# Patient Record
Sex: MALE | Race: WHITE | NOT HISPANIC OR LATINO | Employment: OTHER | URBAN - METROPOLITAN AREA
[De-identification: names, ages, dates, MRNs, and addresses within clinical notes are randomized per-mention and may not be internally consistent; named-entity substitution may affect disease eponyms.]

---

## 2017-01-10 ENCOUNTER — GENERIC CONVERSION - ENCOUNTER (OUTPATIENT)
Dept: OTHER | Facility: OTHER | Age: 72
End: 2017-01-10

## 2017-02-21 ENCOUNTER — GENERIC CONVERSION - ENCOUNTER (OUTPATIENT)
Dept: OTHER | Facility: OTHER | Age: 72
End: 2017-02-21

## 2017-11-01 ENCOUNTER — RX ONLY (RX ONLY)
Age: 72
End: 2017-11-01

## 2017-11-01 ENCOUNTER — DOCTOR'S OFFICE (OUTPATIENT)
Dept: URBAN - METROPOLITAN AREA CLINIC 136 | Facility: CLINIC | Age: 72
Setting detail: OPHTHALMOLOGY
End: 2017-11-01
Payer: COMMERCIAL

## 2017-11-01 DIAGNOSIS — Z79.84: ICD-10-CM

## 2017-11-01 DIAGNOSIS — E11.3293: ICD-10-CM

## 2017-11-01 DIAGNOSIS — E11.9: ICD-10-CM

## 2017-11-01 DIAGNOSIS — H18.423: ICD-10-CM

## 2017-11-01 DIAGNOSIS — H40.003: ICD-10-CM

## 2017-11-01 DIAGNOSIS — H04.123: ICD-10-CM

## 2017-11-01 PROCEDURE — 92134 CPTRZ OPH DX IMG PST SGM RTA: CPT | Performed by: OPHTHALMOLOGY

## 2017-11-01 PROCEDURE — 92004 COMPRE OPH EXAM NEW PT 1/>: CPT | Performed by: OPHTHALMOLOGY

## 2017-11-01 ASSESSMENT — SUPERFICIAL PUNCTATE KERATITIS (SPK)
OD_SPK: 2+ 3+
OS_SPK: 2+ 3+

## 2017-11-01 ASSESSMENT — CONFRONTATIONAL VISUAL FIELD TEST (CVF)
OS_FINDINGS: FULL
OD_FINDINGS: FULL

## 2017-11-01 ASSESSMENT — CORNEAL DYSTROPHY - BAND KERATOPATHY
OD_BANDKERATOPATHY: 2+
OS_BANDKERATOPATHY: 2+

## 2017-11-07 ENCOUNTER — ALLSCRIPTS OFFICE VISIT (OUTPATIENT)
Dept: OTHER | Facility: OTHER | Age: 72
End: 2017-11-07

## 2017-11-07 ENCOUNTER — LAB REQUISITION (OUTPATIENT)
Dept: LAB | Facility: HOSPITAL | Age: 72
End: 2017-11-07
Payer: COMMERCIAL

## 2017-11-07 DIAGNOSIS — Z12.5 ENCOUNTER FOR SCREENING FOR MALIGNANT NEOPLASM OF PROSTATE: ICD-10-CM

## 2017-11-07 DIAGNOSIS — E11.65 TYPE 2 DIABETES MELLITUS WITH HYPERGLYCEMIA (HCC): ICD-10-CM

## 2017-11-07 LAB
ALBUMIN SERPL BCP-MCNC: 3.6 G/DL (ref 3.5–5)
ALP SERPL-CCNC: 76 U/L (ref 46–116)
ALT SERPL W P-5'-P-CCNC: 38 U/L (ref 12–78)
ANION GAP SERPL CALCULATED.3IONS-SCNC: 10 MMOL/L (ref 4–13)
AST SERPL W P-5'-P-CCNC: 18 U/L (ref 5–45)
BASOPHILS # BLD AUTO: 0.03 THOUSANDS/ΜL (ref 0–0.1)
BASOPHILS NFR BLD AUTO: 1 % (ref 0–1)
BILIRUB SERPL-MCNC: 0.38 MG/DL (ref 0.2–1)
BUN SERPL-MCNC: 13 MG/DL (ref 5–25)
CALCIUM SERPL-MCNC: 10.1 MG/DL (ref 8.3–10.1)
CHLORIDE SERPL-SCNC: 102 MMOL/L (ref 100–108)
CHOLEST SERPL-MCNC: 219 MG/DL (ref 50–200)
CO2 SERPL-SCNC: 25 MMOL/L (ref 21–32)
CREAT SERPL-MCNC: 0.91 MG/DL (ref 0.6–1.3)
EOSINOPHIL # BLD AUTO: 0.08 THOUSAND/ΜL (ref 0–0.61)
EOSINOPHIL NFR BLD AUTO: 2 % (ref 0–6)
ERYTHROCYTE [DISTWIDTH] IN BLOOD BY AUTOMATED COUNT: 12.3 % (ref 11.6–15.1)
EST. AVERAGE GLUCOSE BLD GHB EST-MCNC: 324 MG/DL
GFR SERPL CREATININE-BSD FRML MDRD: 84 ML/MIN/1.73SQ M
GLUCOSE P FAST SERPL-MCNC: 298 MG/DL (ref 65–99)
HBA1C MFR BLD: 12.9 % (ref 4.2–6.3)
HCT VFR BLD AUTO: 42.4 % (ref 36.5–49.3)
HDLC SERPL-MCNC: 54 MG/DL (ref 40–60)
HGB BLD-MCNC: 14.7 G/DL (ref 12–17)
LDLC SERPL CALC-MCNC: 131 MG/DL (ref 0–100)
LYMPHOCYTES # BLD AUTO: 1.32 THOUSANDS/ΜL (ref 0.6–4.47)
LYMPHOCYTES NFR BLD AUTO: 29 % (ref 14–44)
MCH RBC QN AUTO: 30.6 PG (ref 26.8–34.3)
MCHC RBC AUTO-ENTMCNC: 34.7 G/DL (ref 31.4–37.4)
MCV RBC AUTO: 88 FL (ref 82–98)
MONOCYTES # BLD AUTO: 0.32 THOUSAND/ΜL (ref 0.17–1.22)
MONOCYTES NFR BLD AUTO: 7 % (ref 4–12)
NEUTROPHILS # BLD AUTO: 2.74 THOUSANDS/ΜL (ref 1.85–7.62)
NEUTS SEG NFR BLD AUTO: 61 % (ref 43–75)
NRBC BLD AUTO-RTO: 0 /100 WBCS
PLATELET # BLD AUTO: 215 THOUSANDS/UL (ref 149–390)
PMV BLD AUTO: 11.6 FL (ref 8.9–12.7)
POTASSIUM SERPL-SCNC: 4.6 MMOL/L (ref 3.5–5.3)
PROT SERPL-MCNC: 7 G/DL (ref 6.4–8.2)
PSA SERPL-MCNC: 0.4 NG/ML (ref 0–4)
RBC # BLD AUTO: 4.81 MILLION/UL (ref 3.88–5.62)
SODIUM SERPL-SCNC: 137 MMOL/L (ref 136–145)
TRIGL SERPL-MCNC: 172 MG/DL
WBC # BLD AUTO: 4.5 THOUSAND/UL (ref 4.31–10.16)

## 2017-11-07 PROCEDURE — 80061 LIPID PANEL: CPT | Performed by: FAMILY MEDICINE

## 2017-11-07 PROCEDURE — 80053 COMPREHEN METABOLIC PANEL: CPT | Performed by: FAMILY MEDICINE

## 2017-11-07 PROCEDURE — 85025 COMPLETE CBC W/AUTO DIFF WBC: CPT | Performed by: FAMILY MEDICINE

## 2017-11-07 PROCEDURE — 83036 HEMOGLOBIN GLYCOSYLATED A1C: CPT | Performed by: FAMILY MEDICINE

## 2017-11-07 PROCEDURE — G0103 PSA SCREENING: HCPCS | Performed by: FAMILY MEDICINE

## 2017-11-08 ENCOUNTER — GENERIC CONVERSION - ENCOUNTER (OUTPATIENT)
Dept: FAMILY MEDICINE CLINIC | Facility: CLINIC | Age: 72
End: 2017-11-08

## 2017-11-08 ENCOUNTER — GENERIC CONVERSION - ENCOUNTER (OUTPATIENT)
Dept: OTHER | Facility: OTHER | Age: 72
End: 2017-11-08

## 2017-11-08 LAB
LEFT EYE DIABETIC RETINOPATHY: NORMAL
RIGHT EYE DIABETIC RETINOPATHY: NORMAL

## 2017-11-08 ASSESSMENT — REFRACTION_AUTOREFRACTION
OS_AXIS: 060
OD_SPHERE: +0.25
OS_CYLINDER: -2.25
OS_SPHERE: +1.25
OD_AXIS: 150
OD_CYLINDER: -2.75

## 2017-11-08 ASSESSMENT — REFRACTION_MANIFEST
OD_VA1: 20/
OD_VA3: 20/
OD_VA3: 20/
OD_VA2: 20/
OD_VA2: 20/
OD_VA1: 20/
OU_VA: 20/
OS_VA2: 20/
OD_VA1: 20/
OS_VA1: 20/
OU_VA: 20/
OS_VA1: 20/
OD_VA2: 20/
OS_VA3: 20/
OS_VA2: 20/
OS_VA1: 20/
OS_VA2: 20/
OS_VA3: 20/
OS_VA3: 20/
OU_VA: 20/
OD_VA3: 20/

## 2017-11-08 ASSESSMENT — REFRACTION_CURRENTRX
OD_OVR_VA: 20/
OD_OVR_VA: 20/
OS_OVR_VA: 20/
OS_OVR_VA: 20/
OD_OVR_VA: 20/
OS_OVR_VA: 20/

## 2017-11-08 ASSESSMENT — VISUAL ACUITY
OD_BCVA: 20/25-2
OS_BCVA: 20/30+1

## 2017-11-08 ASSESSMENT — SPHEQUIV_DERIVED
OD_SPHEQUIV: -1.125
OS_SPHEQUIV: 0.125

## 2017-11-08 NOTE — PROGRESS NOTES
Assessment  1  Diabetes mellitus type 2, uncontrolled (250 02) (E11 65)   2  BPH (benign prostatic hyperplasia) (600 00) (N40 0)   3  Dyslipidemia (272 4) (E78 5)   4  Hypertension (401 9) (I10)    Plan  Diabetes mellitus type 2, uncontrolled    · (1) CBC/PLT/DIFF; Status:Hold For - Exact Date; Requested for: In Office Collection;    · (1) COMPREHENSIVE METABOLIC PANEL; Status:Hold For - Exact Date; Requested  for: In Office Collection;    · (1) HEMOGLOBIN A1C; Status:Hold For - Exact Date; Requested for: In Office Collection;    · (1) LIPID PANEL, FASTING; Status:Hold For - Exact Date; Requested for: In Office  Collection;    · (1) MICROALBUMIN CREATININE RATIO, RANDOM URINE; Status:Hold For - Exact Date; Requested for: In Office Collection;   Diabetes mellitus type 2, uncontrolled, Prostate cancer screening    · (1) PSA (SCREEN) (Dx V76 44 Screen for Prostate Cancer); Status:Hold For - Exact Date; Requested for: In Office Collection;   DMII (diabetes mellitus, type 2)    · *VB - Foot Exam; Status:Resulted - Requires Verification,Retrospective By Protocol  Authorization;   Done: 42UGL5706 11:39AM    Discussion/Summary    Recommend recheck in office in 4 months  Patient has had flu vaccine  We will refill medications for patient  Urged better compliance  Discussed options for getting generic medications least expensive at particular pharmacies locally  The patient was counseled regarding instructions for management,-- risks and benefits of treatment options  total time of encounter was 25 minutes-- and-- 15 minutes was spent counseling  Chief Complaint  recheck for lungs  Patient is here today for follow up of chronic conditions described in HPI  History of Present Illness  Patient here for recheck on chronic medical conditions  He has not been seen in approximately a year  He is not following with an endocrinologist recently  He admits to noncompliance with medication   He states the cost is the biggest barrier to compliance  He states he was without insurance for a while and this affected ability to obtain insurance  The patient states his hyperlipidemia has been stable since the last visit  Comorbid Illnesses: diabetes mellitus  He has no significant interval events  Symptoms: denies chest pain,-- denies intermittent leg claudication,-- denies muscle pain-- and-- denies muscle weakness  Associated symptoms include no focal neurologic deficits-- and-- no memory loss  Medications: the patient is not adherent with his medication regimen-- (Patient is unsure if he is taking medications)  He reports inability to manage his medications, but-- belief that the medication is helping  The patient states he has been doing well with his blood pressure control since the last visit  He has no comorbid illnesses  He has no significant interval events  Symptoms: The patient is currently asymptomatic  The patient states he has been doing poorly with his Type II Diabetes control since the last visit  He has no comorbid illnesses  He has no known diabetic complications  He has no significant interval events  Symptoms: The patient is currently asymptomatic  Review of Systems    Constitutional: No fever or chills, feels well, no tiredness, no recent weight gain or weight loss  Eyes: No complaints of eye pain, no red eyes, no discharge from eyes, no itchy eyes  ENT: no complaints of earache, no hearing loss, no nosebleeds, no nasal discharge, no sore throat, no hoarseness  Cardiovascular: No complaints of slow heart rate, no fast heart rate, no chest pain, no palpitations, no leg claudication, no lower extremity  Respiratory: No complaints of shortness of breath, no wheezing, no cough, no SOB on exertion, no orthopnea or PND  Gastrointestinal: No complaints of abdominal pain, no constipation, no nausea or vomiting, no diarrhea or bloody stools     Genitourinary: No complaints of dysuria, no incontinence, no hesitancy, no nocturia, no genital lesion, no testicular pain  Musculoskeletal: No complaints of arthralgia, no myalgias, no joint swelling or stiffness, no limb pain or swelling  Integumentary: No complaints of skin rash or skin lesions, no itching, no skin wound, no dry skin  Neurological: No compliants of headache, no confusion, no convulsions, no numbness or tingling, no dizziness or fainting, no limb weakness, no difficulty walking  Psychiatric: Is not suicidal, no sleep disturbances, no anxiety or depression, no change in personality, no emotional problems  Endocrine: No complaints of proptosis, no hot flashes, no muscle weakness, no erectile dysfunction, no deepening of the voice, no feelings of weakness  Hematologic/Lymphatic: No complaints of swollen glands, no swollen glands in the neck, does not bleed easily, no easy bruising  Active Problems  1  Acute serous otitis media, unspecified laterality   2  BPH (benign prostatic hyperplasia) (600 00) (N40 0)   3  Cutaneous skin tags (701 9) (L91 8)   4  Diabetes mellitus type 2, uncontrolled (250 02) (E11 65)   5  DMII (diabetes mellitus, type 2) (250 00) (E11 9)   6  Dyslipidemia (272 4) (E78 5)   7  Obesity (278 00) (E66 9)   8  Prostate cancer screening (V76 44) (Z12 5)   9  Psoriasis (696 1) (L40 9)   10  Screening for colon cancer (V76 51) (Z12 11)    Past Medical History  1  History of No pertinent past surgical history    The active problems and past medical history were reviewed and updated today  Surgical History  1  History of Tonsillectomy   2  History of Treatment Of The Right Leg    Family History  Mother    1  Family history of Heart problem  Sister    2  Family history of Bladder Cancer (V16 52)  Brother    3  Family history of diabetes mellitus (V18 0) (Z83 3)   4   Family history of Leukemia (V16 6)    Social History   · Daily Coffee Consumption (2  Cups/Day)   · Former smoker (S51 32) (E11 884)   · Stopped Drinking Alcohol  The social history was reviewed and updated today  The social history was reviewed and is unchanged  Current Meds   1  Glimepiride 2 MG Oral Tablet; TAKE ONE TABLET BY MOUTH TWICE A DAY WITH MEALS; Therapy: 03Tqc4837 to (Evaluate:92Ejp5807)  Requested for: 49IZU5793; Last   Rx:16May2016 Ordered   2  Lisinopril 20 MG Oral Tablet; TAKE 1 TABLET DAILY; Therapy: 65UCD5341 to (Evaluate:44Bfp2030)  Requested for: 51JOJ7968; Last   Rx:13Mar2017 Ordered   3  MetFORMIN HCl - 1000 MG Oral Tablet; TAKE 1 TABLETS  twice a day with meals; Therapy: 90Vkk2137 to (Evaluate:11May2017)  Requested for: 39IKX4939; Last   Rx:16May2016 Ordered   4  OneTouch Delica Lancets 38L Miscellaneous; Check twice a day; Therapy: 96OBL4940 to (Evaluate:18Feb2017)  Requested for: 61Xsk3537; Last   Rx:30Pry1438 Ordered   5  OneTouch Ultra Blue In [employer]; TEST 3 times  DAILY; Therapy: 30KWU2767 to (Evaluate:51Nyr2499)  Requested for: 57SSI1251; Last   Rx:22Std1269 Ordered   6  Pioglitazone HCl - 15 MG Oral Tablet; TAKE 1 TABLET DAILY FOR DIABETES; Therapy: 19XQS6401 to (Evaluate:59Dwu4461)  Requested for: 17QTZ7688; Last   Rx:15May2017 Ordered   7  Simvastatin 20 MG Oral Tablet; TAKE 1 TABLET AT BEDTIME; Therapy: 37FQW0959 to (Stephnaie Nj)  Requested for: 61HWZ2345; Last   Rx:27May2016 Ordered   8  Tamsulosin HCl - 0 4 MG Oral Capsule; TAKE 1 CAPSULE Bedtime For Prostate; Therapy: 64Skm0266 to (Evaluate:85Aeo9389)  Requested for: 04SYR4816; Last   Rx:95Aoe9136 Ordered    The medication list was reviewed and updated today  Allergies  1  No Known Drug Allergies    Vitals  Vital Signs    Recorded: 65HFJ5307 11:12AM   Temperature 69 6 F   Systolic 685   Diastolic 90   Height 5 ft 3 in   Weight 218 lb    BMI Calculated 38 62   BSA Calculated 2 01   O2 Saturation 97     Physical Exam    Constitutional   General appearance: No acute distress, well appearing and well nourished      Eyes   Conjunctiva and lids: No swelling, erythema, or discharge  Pupils and irises: Equal, round and reactive to light  Ears, Nose, Mouth, and Throat   External inspection of ears and nose: Normal     Otoscopic examination: Tympanic membrance translucent with normal light reflex  Canals patent without erythema  Nasal mucosa, septum, and turbinates: Normal without edema or erythema  Oropharynx: Normal with no erythema, edema, exudate or lesions  Pulmonary   Respiratory effort: No increased work of breathing or signs of respiratory distress  Auscultation of lungs: Clear to auscultation, equal breath sounds bilaterally, no wheezes, no rales, no rhonci  Cardiovascular   Palpation of heart: Normal PMI, no thrills  Auscultation of heart: Normal rate and rhythm, normal S1 and S2, without murmurs  Examination of extremities for edema and/or varicosities: Normal     Carotid pulses: Normal     Abdomen   Abdomen: Non-tender, no masses  Liver and spleen: No hepatomegaly or splenomegaly  Lymphatic   Palpation of lymph nodes in neck: No lymphadenopathy  Musculoskeletal   Gait and station: Normal     Digits and nails: Normal without clubbing or cyanosis  Inspection/palpation of joints, bones, and muscles: Normal     Skin   Skin and subcutaneous tissue: Normal without rashes or lesions  Neurologic   Cranial nerves: Cranial nerves 2-12 intact  Reflexes: 2+ and symmetric  Sensation: No sensory loss  Psychiatric   Orientation to person, place and time: Normal     Mood and affect: Normal         Socks and shoes removed, Right Foot Findings: normal foot, no swelling, no erythema  The right toes were normal-- and-- had full range of motion  The sensory exam showed normal vibratory sensation at the level of the toes on the right  Normal tactile sensation with monofilament testing throughout the right foot  Socks and shoes removed, Left Foot Findings: normal foot, no swelling, no erythema   The left toes were normal-- and-- had full range of motion  The sensory exam showed normal vibratory sensation at the level of the toes on the left  Normal tactile sensation with monofilament testing throughout the left foot  Pulses:   2+ in the dorsalis pedis on the right  Pulses:   2+ in the dorsalis pedis on the left  Health Management  Screening for colon cancer   COLONOSCOPY; every 5 years; Next Due: 06Fnc9573;  Overdue    Signatures   Electronically signed by : Cassy Maldonado DO; Nov 7 2017 11:48AM EST                       (Author)

## 2017-11-10 ENCOUNTER — GENERIC CONVERSION - ENCOUNTER (OUTPATIENT)
Dept: OTHER | Facility: OTHER | Age: 72
End: 2017-11-10

## 2017-11-14 ENCOUNTER — GENERIC CONVERSION - ENCOUNTER (OUTPATIENT)
Dept: OTHER | Facility: OTHER | Age: 72
End: 2017-11-14

## 2017-11-22 ENCOUNTER — DOCTOR'S OFFICE (OUTPATIENT)
Dept: URBAN - METROPOLITAN AREA CLINIC 136 | Facility: CLINIC | Age: 72
Setting detail: OPHTHALMOLOGY
End: 2017-11-22
Payer: COMMERCIAL

## 2017-11-22 DIAGNOSIS — H40.003: ICD-10-CM

## 2017-11-22 DIAGNOSIS — H04.123: ICD-10-CM

## 2017-11-22 DIAGNOSIS — H18.423: ICD-10-CM

## 2017-11-22 DIAGNOSIS — Z79.84: ICD-10-CM

## 2017-11-22 PROBLEM — E11.9 DIABETES TYPE 2 NO RETINOPATHY: Status: ACTIVE | Noted: 2017-11-01

## 2017-11-22 PROCEDURE — 92012 INTRM OPH EXAM EST PATIENT: CPT | Performed by: OPHTHALMOLOGY

## 2017-11-22 ASSESSMENT — REFRACTION_AUTOREFRACTION
OD_AXIS: 150
OS_CYLINDER: -2.25
OS_AXIS: 060
OD_CYLINDER: -2.75
OD_SPHERE: +0.25
OS_SPHERE: +1.25

## 2017-11-22 ASSESSMENT — REFRACTION_MANIFEST
OS_VA2: 20/
OD_VA3: 20/
OD_VA2: 20/
OD_VA1: 20/
OD_VA1: 20/
OS_VA3: 20/
OS_VA1: 20/
OS_VA2: 20/
OU_VA: 20/
OU_VA: 20/
OD_VA2: 20/
OD_VA2: 20/
OD_VA3: 20/
OS_VA3: 20/
OS_VA1: 20/
OU_VA: 20/
OS_VA1: 20/
OS_VA3: 20/
OD_VA3: 20/
OD_VA1: 20/
OS_VA2: 20/

## 2017-11-22 ASSESSMENT — SUPERFICIAL PUNCTATE KERATITIS (SPK)
OD_SPK: 2+ 3+
OS_SPK: 2+ 3+

## 2017-11-22 ASSESSMENT — VISUAL ACUITY
OD_BCVA: 20/30+2
OS_BCVA: 20/40

## 2017-11-22 ASSESSMENT — CORNEAL DYSTROPHY - BAND KERATOPATHY
OD_BANDKERATOPATHY: 1+
OS_BANDKERATOPATHY: 1+

## 2017-11-22 ASSESSMENT — REFRACTION_CURRENTRX
OS_OVR_VA: 20/
OD_OVR_VA: 20/
OD_OVR_VA: 20/
OS_OVR_VA: 20/
OS_OVR_VA: 20/
OD_OVR_VA: 20/

## 2017-11-22 ASSESSMENT — CONFRONTATIONAL VISUAL FIELD TEST (CVF)
OD_FINDINGS: FULL
OS_FINDINGS: FULL

## 2017-11-22 ASSESSMENT — SPHEQUIV_DERIVED
OD_SPHEQUIV: -1.125
OS_SPHEQUIV: 0.125

## 2018-01-10 NOTE — PROGRESS NOTES
Plan    1  DSMT/MNT Time Record; Status:Complete;   Done: 50VTE9902 12:00AM    Discussion/Summary    PATIENT EDUCATION RECORD   Indication for Services: type 2 Diabetes Mellitus  He is ready to learn  He has no barriers to learning  Healthy Eating:   Discussed general nutrition topics: Method: Instruction  Response: Verbalizes Understanding   Discussed importance of meal timing/consistency: Method: Instruction  Response: Verbalizes Understanding   Discussed nutrient types ( Cho/Fat/Protein): Method: Instruction and Handout  Response: Verbalizes Understanding   Discussed portion sizes: Method: Instruction and Handout  Response: Verbalizes Understanding  Provided food diary and instructions on use: Method: Instruction and HandoutResponse: Verbalizes Understanding   Provided meal planning: Method: Instruction  Response: Verbalizes Understanding His current weight is 217 5  His keal needs are ~1700 calories a day   His CHO's per meal are 45-60 grams  Discussed fat intake and choices: Method: Instruction and Handout  Response: Verbalizes Understanding   Discussed basic carbohydrate counting: Method: Instruction and Handout  Response: Verbalizes Understanding   Discussed Stop Light Strategy  Method: Instruction and Handout  Response: Verbalizes Understanding   Being Active:   Stated the benefits of exercise: Method: Instruction  Response: Verbalizes Understanding   Discussed "exercise guidelines": Method: Instruction  Response: Verbalizes Understanding ~He/She needs a follow up class in 4/6/2016   He was given the following educational materials: portion book and Stop Light Strategy   Chief Complaint  Patient with T2DM seen for diet consult per MD request       History of Present Illness  Weight at visit was 217 5 pounds  Patient states, "I weighed ~205 pounds in the fall when I was walking 2 hours daily  I stopped exercising because of the cold weather and my weight went up 15 pounds   I do plan on starting up with the walking again"  Patient reports getting government assistance for purchasing food (food stamps)  He uses a food bank 1x a month and gets one monthly delivery of food  Though, he cannot remember where the delivery of food comes from  Problems identified in food recall include inconsistent carbohydrate intake at meals, meal skipping, excess carbs/calories coming from sweetened iced tea and juice (consumes 3-4 (16oz) bottles of sweetened iced tea daily), excess calories from HS snacking, large portions, low intake of plant based foods, whole grains, and low fat dairy and general lack of balance  Encouraged patient to consume 3 meals a day 4-5 hours apart  Advised him to eliminate ALL sweetened beverages and limit his HS snack to either 1/2 cup of ice cream or 10 potato chips or 3 pretzels to assist with glycemic control  Patient would benefit from including some carbohydrate at all meals  Instructed patient on the Stop Light Strategy for making healthy food choices  Marileejonathan Gross was encouraged to keep his "red category" food intake to one serving a day  Patient is planning on resuming exercise soon and working his way back up to 2 hour walks daily  Lola Gross agree to keep daily food logs  He is scheduled for follow-up on 4/6/2016  RD will remain available for further dietary questions/concerns  This is his initial assessment    Present at session: patient    Medical Diagnosis/Reason for Referral:T2DM, Obesity, HTN, Dislipidemia  He has no special learning needs  His caloric needs are 1700 calories  Recent weight change: reported 15# weight gain since the fall  Patient  shops for food  Patient  cooks the food  Exercise routine:  Currently, patient is not exercising beyond his daily activities  Spring, summer and fall patient reports walking 2 hours daily     He eats breakfast at  8-9:00AM AM SKIPS MOST DAYS OF THE WEEK; coffee with milk and Equal daily; 2x a week 2 packets of flavored oatmeal or plain oatmeal OR 1 cup Bran Flakes with 1 cup 2% milk   He snacks at 10:00 AM 2x a week: 4 margarito cracker squares with 1 tsp PB; sweetened icced tea   He eats lunch at  12:00PM PM Leftovers: 2 stuffed peppers made with 90% ground beef, onion, celery and tomato sauce (no rice) OR cabbage filled with ground meat (no rice) OR 2 slices white [place] bread, 1 tsp mayonnaise, spiced or tavern ham, 1 slice white American cheese; iced tea or coffee    He eats dinner at  5:00 PM 3-4oz chicken thigh or breast, 1 cup mashed potato, 1/2-3/4 cup corn or peas or carrots, iced tea or coffee   He snacks at 8:00PM at bedtime 3x a week: 2 cups ice cream OR un-portioned potato chips or pretzels     NUTRITION DIAGNOSES   Food And Nutrition Related Knowledge Defici   Food and nutrition related knowledge deficit related to  lack of prior exposure to accurate nutrition related information  As evidenced by  no prior knowledge of need for food and nutrition related recommendations  Medical Nutrition Therapy Intervention: Carbohydrate counting, Meal planning, Strategies to increase the intake of plant based foods, Strategies to monitor portion control, Meal timing, Exercise Guidelines, Weight/BMI Goals and Stop Light Strategy   His comprehension was good   His motivation was good   His compliance was good   Goals:  1  Include breakfast daily (no sweetened cereals)  2  Avoid all sweetened beverages and juice  3  Initiate regular aerobic exercise  Results/Data  Encounter Results   DSMT/MNT Time Record [id number] 12:00AM Thaddeus Hernandez     Test Name Result Flag Reference   Date of Service 2/24/2016     Start - Stop Time 9:45-10:45AM     Total MInutes 60 minutes     Group Or Individual Instruction MNT-I       Future Appointments    Date/Time Provider Specialty Site   05/16/2016 10:00 AM JACINTA Craig   Endocrinology [facility] ENDOCRINOLOG[facility]   04/06/2016 09:45 AM Thaddeus Hernandez RD, LDN Diabetes Educator [facility] ENDOCRINOLOG[facility] Signatures   Electronically signed by : Warden Smitha High; Feb 25 2016 11:01AM EST                       (Author)    Electronically signed by : Warden Smitha High; Feb 25 2016 11:20AM EST                       (Author)    Electronically signed by : JACINTA Patel ; Feb 26 2016 10:38AM EST

## 2018-01-11 NOTE — RESULT NOTES
Message   A1C high 10 3, send over log in 2 weeks      Verified Results  (1) COMPREHENSIVE METABOLIC PANEL 37GDL7496 22:22KJ Juan Cea Kidney Disease Education Program recommendations are as follows:  GFR calculation is accurate only with a steady state creatinine  Chronic Kidney disease less than 60 ml/min/1 73 sq  meters  Kidney failure less than 15 ml/min/1 73 sq  meters  Test Name Result Flag Reference   GLUCOSE,RANDM 234 mg/dL H    SODIUM 139 mmol/L  136-145   POTASSIUM 4 4 mmol/L  3 5-5 3   CHLORIDE 105 mmol/L  100-108   CARBON DIOXIDE 27 mmol/L  21-32   ANION GAP (CALC) 7 mmol/L  4-13   BLOOD UREA NITROGEN 16 mg/dL  5-25   CREATININE 0 88 mg/dL  0 60-1 30   Standardized to IDMS reference method   CALCIUM 9 3 mg/dL  8 3-10 1   BILI, TOTAL 0 29 mg/dL  0 20-1 00   ALK PHOSPHATAS 78 U/L     ALT (SGPT) 41 U/L  12-78   AST(SGOT) 20 U/L  5-45   ALBUMIN 3 4 g/dL L 3 5-5 0   TOTAL PROTEIN 6 8 g/dL  6 4-8 2   eGFR Non-African American      >60 0 ml/min/1 73sq m     (1) MICROALBUMIN CREATININE RATIO, RANDOM URINE [id number] 04:50PM Marlyce Marine     Test Name Result Flag Reference   MICROALBUMIN/ CREAT R 8 mg/g creatinine  0-30   MICROALBUMIN,URINE 20 7 mg/L H 0 0-20 0   CREATININE URINE 252 0 mg/dL       (1) HEMOGLOBIN A1C 74EPQ3744 04:50PM Jenna Potter   5 7-6 4% impaired fasting glucose  >=6 5% diagnosis of diabetes    Falsely low levels are seen in conditions linked to short RBC life span-  hemolytic anemia, and splenomegaly  Falsely elevated levels are seen in situations where there is an increased production of RBC- receipt of erythropoietin or blood transfusions  Adopted from ADA-Clinical Practice Recommendations     Test Name Result Flag Reference   HEMOGLOBIN A1C 10 3 % H 4 0-5 6   EST  AVG   GLUCOSE 249 mg/dl

## 2018-01-12 NOTE — RESULT NOTES
Message   a1c improved from 10 3 to 8 , goal less than 7 , continue current regimen and watch diet     Lipids higher than last year is he not taking simvastatin ? Verified Results  (1) COMPREHENSIVE METABOLIC PANEL 25PQQ5184 97:37AP Nathen Mathews     Test Name Result Flag Reference   GLUCOSE,RANDM 225 mg/dL H    If the patient is fasting, the ADA then defines impaired fasting glucose as > 100 mg/dL and diabetes as > or equal to 123 mg/dL  SODIUM 139 mmol/L  136-145   POTASSIUM 4 3 mmol/L  3 5-5 3   CHLORIDE 105 mmol/L  100-108   CARBON DIOXIDE 27 mmol/L  21-32   ANION GAP (CALC) 7 mmol/L  4-13   BLOOD UREA NITROGEN 17 mg/dL  5-25   CREATININE 0 90 mg/dL  0 60-1 30   Standardized to IDMS reference method   CALCIUM 9 7 mg/dL  8 3-10 1   BILI, TOTAL 0 25 mg/dL  0 20-1 00   ALK PHOSPHATAS 67 U/L     ALT (SGPT) 39 U/L  12-78   AST(SGOT) 22 U/L  5-45   ALBUMIN 3 8 g/dL  3 5-5 0   TOTAL PROTEIN 6 8 g/dL  6 4-8 2   eGFR Non-African American      >60 0 ml/min/1 73sq m   [facility] Disease Education Program recommendations are as follows:  GFR calculation is accurate only with a steady state creatinine  Chronic Kidney disease less than 60 ml/min/1 73 sq  meters  Kidney failure less than 15 ml/min/1 73 sq  meters  (1) LIPID PANEL, FASTING [id number] 04:51PM Nathen Mathews     Test Name Result Flag Reference   CHOLESTEROL 229 mg/dL H    HDL,DIRECT 53 mg/dL  40-60   Specimen collection should occur prior to Metamizole administration due to the potential for falsely depressed results     LDL CHOLESTEROL CALCULATED 141 mg/dL H 0-100   Triglyceride:         Normal              <150 mg/dl       Borderline High    150-199 mg/dl       High               200-499 mg/dl       Very High          >499 mg/dl  Cholesterol:         Desirable        <200 mg/dl      Borderline High  200-239 mg/dl      High             >239 mg/dl  HDL Cholesterol:        High    >59 mg/dL      Low     <41 mg/dL  LDL CALCULATED:    This screening LDL is a calculated result  It does not have the accuracy of the Direct Measured LDL in the monitoring of patients with hyperlipidemia and/or statin therapy  Direct Measure LDL (LHC293) must be ordered separately in these patients  TRIGLYCERIDES 175 mg/dL H <=150   Specimen collection should occur prior to N-Acetylcysteine or Metamizole administration due to the potential for falsely depressed results  (1) HEMOGLOBIN A1C [id number] 04:51PM Digna Villalpando     Test Name Result Flag Reference   HEMOGLOBIN A1C 8 0 % H 4 2-6 3   EST  AVG   GLUCOSE 183 mg/dl

## 2018-01-13 VITALS
DIASTOLIC BLOOD PRESSURE: 90 MMHG | WEIGHT: 218 LBS | HEIGHT: 63 IN | SYSTOLIC BLOOD PRESSURE: 130 MMHG | TEMPERATURE: 96.8 F | OXYGEN SATURATION: 97 % | BODY MASS INDEX: 38.62 KG/M2

## 2018-01-13 NOTE — PROGRESS NOTES
Plan    1  DSMT/MNT Time Record; Status:Complete;   Done: 50UMM5533 12:00AM    Discussion/Summary    PATIENT EDUCATION RECORD   Indication for Services: type 2 Diabetes Mellitus  He is ready to learn  He has no barriers to learning  Healthy Eating:   Discussed general nutrition topics: Method: Instruction  Response: Verbalizes Understanding   Discussed importance of meal timing/consistency: Method: Instruction  Response: Verbalizes UnderstandingResponse: His current weight is 215  Discussed basic carbohydrate counting: Method: Instruction  Response: Verbalizes Understanding   Discussed Food record review  Method:  Chief Complaint  Patient seen for nutrition follow-up visit  History of Present Illness  Weight at today's visit was 215 pounds  Weight down 2 5 pounds since his initial visit on 2/24/2016  Positive changes patient reports making include trying to consume breakfast daily and eliminating the intake of juice/sweetened beverages  Patient states, "I gave away all the juice I got from the food bank  If I don't have it at home I won't drink it"  Food records were incomplete  However, Carlyn Santana appears to be trying to make some healthy food choices  Encouraged him to continue with his goals of three meals a day and no sweetened beverages/juice  Patient reports feeling frustrated with his blood glucose numbers as some of them are high  Per discussion, patient has only been taking one Glimepiride pill a day rather than the two prescribed and he has been taking both of the Metformin pills at the dinner meal  Reviewed patient's prescriptions with him and advised him to take one of each pill at both breakfast and dinner  Patient will continue keeping blood glucose logs and submit them to the MD for review  Carlyn Santana has not initiated aerobic exercise  He was encouraged to do so and work his way up to 150 minutes a week  Patient did not wish to schedule follow-up at this time   RD will remain available for further dietary questions/concerns  This is his follow-up assessment   Present at session: patient    Exercise routine:  Patient has not initiated aerobic exercise  Medical Nutrition Therapy Intervention: Meal timing, Behavior modification strategies and Food record review   His comprehension was good   His motivation was good   His compliance was good   Goals:  1  Consume 3 meals a day  2  Avoid juice/sweetened beverages  3  Initiate aerobic exercise  Vitals  Signs [Data Includes: Current Encounter]   Recorded: 17VDS9316 05:03PM   Weight: 215 lb   BMI Calculated: 37 49  BSA Calculated: 2 01    Results/Data  Encounter Results   DSMT/MNT Time Record [id number] 12:00AM Jose Kwong     Test Name Result Flag Reference   Date of Service 4/6/2016     Start - Stop Time 9:45-10:15AM     Total MInutes 30 minutes     Group Or Individual Instruction MNT-I       Future Appointments    Date/Time Provider Specialty Site   05/16/2016 10:00 AM JACINTA Montiel   Endocrinology [facility] ENDOCRINOLOGY     Signatures   Electronically signed by : Sandra Bianchi [facility]; Apr 6 2016  5:39PM EST                       (Author)    Electronically signed by : JACINTA Méndez ; Apr 7 2016  1:06PM EST

## 2018-01-17 NOTE — RESULT NOTES
Discussion/Summary   Diabetes is under very poor control  Recommend restarting all medications prescribed  Recommend recheck in office again in 3 months  Verified Results  (1) CBC/PLT/DIFF [id number] 11:50AM Franck Gibson     Test Name Result Flag Reference   WBC COUNT 4 50 Thousand/uL  4 31-10 16   RBC COUNT 4 81 Million/uL  3 88-5 62   HEMOGLOBIN 14 7 g/dL  12 0-17 0   HEMATOCRIT 42 4 %  36 5-49 3   MCV 88 fL  82-98   MCH 30 6 pg  26 8-34 3   MCHC 34 7 g/dL  31 4-37 4   RDW 12 3 %  11 6-15 1   MPV 11 6 fL  8 9-12 7   PLATELET COUNT 128 Thousands/uL  149-390   nRBC AUTOMATED 0 /100 WBCs     NEUTROPHILS RELATIVE PERCENT 61 %  43-75   LYMPHOCYTES RELATIVE PERCENT 29 %  14-44   MONOCYTES RELATIVE PERCENT 7 %  4-12   EOSINOPHILS RELATIVE PERCENT 2 %  0-6   BASOPHILS RELATIVE PERCENT 1 %  0-1   NEUTROPHILS ABSOLUTE COUNT 2 74 Thousands/? ??L  1 85-7 62   LYMPHOCYTES ABSOLUTE COUNT 1 32 Thousands/? ??L  0 60-4 47   MONOCYTES ABSOLUTE COUNT 0 32 Thousand/? ??L  0 17-1 22   EOSINOPHILS ABSOLUTE COUNT 0 08 Thousand/? ??L  0 00-0 61   BASOPHILS ABSOLUTE COUNT 0 03 Thousands/? ??L  0 00-0 10     (1) COMPREHENSIVE METABOLIC PANEL [id number] 69:84CM Franck Gibson     Test Name Result Flag Reference   SODIUM 137 mmol/L  136-145   POTASSIUM 4 6 mmol/L  3 5-5 3   CHLORIDE 102 mmol/L  100-108   CARBON DIOXIDE 25 mmol/L  21-32   ANION GAP (CALC) 10 mmol/L  4-13   BLOOD UREA NITROGEN 13 mg/dL  5-25   CREATININE 0 91 mg/dL  0 60-1 30   Standardized to IDMS reference method   CALCIUM 10 1 mg/dL  8 3-10 1   BILI, TOTAL 0 38 mg/dL  0 20-1 00   ALK PHOSPHATAS 76 U/L     ALT (SGPT) 38 U/L  12-78   Specimen collection should occur prior to Sulfasalazine and/or Sulfapyridine administration due to the potential for falsely depressed results  AST(SGOT) 18 U/L  5-45   Specimen collection should occur prior to Sulfasalazine administration due to the potential for falsely depressed results     ALBUMIN 3 6 g/dL  3 5-5 0   TOTAL PROTEIN 7 0 g/dL  6 4-8 2   eGFR 84 ml/min/1 73sq m     National Kidney Disease Education Program recommendations are as follows:  GFR calculation is accurate only with a steady state creatinine  Chronic Kidney disease less than 60 ml/min/1 73 sq  meters  Kidney failure less than 15 ml/min/1 73 sq  meters  GLUCOSE FASTING 298 mg/dL H 65-99   Specimen collection should occur prior to Sulfasalazine administration due to the potential for falsely depressed results  Specimen collection should occur prior to Sulfapyridine administration due to the potential for falsely elevated results  (1) HEMOGLOBIN A1C [id number] 11:50AM Zawatt     Test Name Result Flag Reference   HEMOGLOBIN A1C 12 9 % H 4 2-6 3   EST  AVG  GLUCOSE 324 mg/dl       (1) LIPID PANEL, FASTING [id number] 11:50AM Zawatt     Test Name Result Flag Reference   CHOLESTEROL 219 mg/dL H    HDL,DIRECT 54 mg/dL  40-60   Specimen collection should occur prior to Metamizole administration due to the potential for falsley depressed results  LDL CHOLESTEROL CALCULATED 131 mg/dL H 0-100   Triglyceride:        Normal <150 mg/dl   Borderline High 150-199 mg/dl   High 200-499 mg/dl   Very High >499 mg/dl      Cholesterol:       Desirable <200 mg/dl    Borderline High 200-239 mg/dl    High >239 mg/dl      HDL Cholesterol:       High>59 mg/dL    Low <41 mg/dL      This screening LDL is a calculated result  It does not have the accuracy of the Direct Measured LDL in the monitoring of patients with hyperlipidemia and/or statin therapy  Direct Measure LDL (NLU586) must be ordered separately in these patients  TRIGLYCERIDES 172 mg/dL H <=150   Specimen collection should occur prior to N-Acetylcysteine or Metamizole administration due to the potential for falsely depressed results       (1) PSA (SCREEN) (Dx V76 44 Screen for Prostate Cancer) [id number] 11:50AM Zawatt     Test Name Result Flag Reference   PROSTATE SPECIFIC ANTIGEN 0 4 ng/mL 0 0-4 0   American Urological Association Guidelines define biochemical recurrence of prostate cancer as a detectable or rising PSA value post-radical prostatectomy that is greater than or equal to 0 2 ng/mL with a second confirmatory level of greater than or equal to 0 2 ng/mL

## 2018-03-08 DIAGNOSIS — E11.9 TYPE 2 DIABETES MELLITUS WITHOUT COMPLICATION, UNSPECIFIED LONG TERM INSULIN USE STATUS: Primary | ICD-10-CM

## 2018-03-08 RX ORDER — GLIMEPIRIDE 2 MG/1
1 TABLET ORAL
COMMUNITY
Start: 2011-08-26 | End: 2018-03-08 | Stop reason: SDUPTHER

## 2018-03-09 RX ORDER — GLIMEPIRIDE 2 MG/1
2 TABLET ORAL
Qty: 90 TABLET | Refills: 3 | Status: SHIPPED | OUTPATIENT
Start: 2018-03-09 | End: 2019-05-06

## 2018-04-04 ENCOUNTER — TRANSCRIBE ORDERS (OUTPATIENT)
Dept: LAB | Facility: HOSPITAL | Age: 73
End: 2018-04-04

## 2018-04-04 ENCOUNTER — APPOINTMENT (OUTPATIENT)
Dept: LAB | Facility: HOSPITAL | Age: 73
End: 2018-04-04
Payer: COMMERCIAL

## 2018-04-04 DIAGNOSIS — R69 DIAGNOSIS UNKNOWN: Primary | ICD-10-CM

## 2018-04-04 DIAGNOSIS — R69 DIAGNOSIS UNKNOWN: ICD-10-CM

## 2018-04-04 LAB
CALCIUM SERPL-MCNC: 9.8 MG/DL (ref 8.3–10.1)
PHOSPHATE SERPL-MCNC: 2.7 MG/DL (ref 2.3–4.1)
URATE SERPL-MCNC: 4.4 MG/DL (ref 4.2–8)

## 2018-04-04 PROCEDURE — 84100 ASSAY OF PHOSPHORUS: CPT

## 2018-04-04 PROCEDURE — 84550 ASSAY OF BLOOD/URIC ACID: CPT

## 2018-04-04 PROCEDURE — 82310 ASSAY OF CALCIUM: CPT

## 2018-04-04 PROCEDURE — 36415 COLL VENOUS BLD VENIPUNCTURE: CPT

## 2018-09-06 ENCOUNTER — OFFICE VISIT (OUTPATIENT)
Dept: FAMILY MEDICINE CLINIC | Facility: CLINIC | Age: 73
End: 2018-09-06
Payer: COMMERCIAL

## 2018-09-06 VITALS
HEART RATE: 68 BPM | TEMPERATURE: 97.6 F | DIASTOLIC BLOOD PRESSURE: 90 MMHG | WEIGHT: 194 LBS | BODY MASS INDEX: 33.12 KG/M2 | HEIGHT: 64 IN | OXYGEN SATURATION: 97 % | SYSTOLIC BLOOD PRESSURE: 138 MMHG

## 2018-09-06 DIAGNOSIS — E78.5 DYSLIPIDEMIA: ICD-10-CM

## 2018-09-06 DIAGNOSIS — E11.9 TYPE 2 DIABETES MELLITUS WITHOUT COMPLICATION, WITHOUT LONG-TERM CURRENT USE OF INSULIN (HCC): ICD-10-CM

## 2018-09-06 DIAGNOSIS — E11.9 DM TYPE 2, GOAL HBA1C < 7% (HCC): Primary | ICD-10-CM

## 2018-09-06 DIAGNOSIS — I10 ESSENTIAL HYPERTENSION: ICD-10-CM

## 2018-09-06 LAB
ALBUMIN SERPL BCP-MCNC: 3.5 G/DL (ref 3.5–5)
ALP SERPL-CCNC: 88 U/L (ref 46–116)
ALT SERPL W P-5'-P-CCNC: 42 U/L (ref 12–78)
ANION GAP SERPL CALCULATED.3IONS-SCNC: 7 MMOL/L (ref 4–13)
AST SERPL W P-5'-P-CCNC: 21 U/L (ref 5–45)
BASOPHILS # BLD AUTO: 0.03 THOUSANDS/ΜL (ref 0–0.1)
BASOPHILS NFR BLD AUTO: 1 % (ref 0–1)
BILIRUB SERPL-MCNC: 0.62 MG/DL (ref 0.2–1)
BUN SERPL-MCNC: 14 MG/DL (ref 5–25)
CALCIUM SERPL-MCNC: 9.7 MG/DL (ref 8.3–10.1)
CHLORIDE SERPL-SCNC: 100 MMOL/L (ref 100–108)
CHOLEST SERPL-MCNC: 255 MG/DL (ref 50–200)
CO2 SERPL-SCNC: 27 MMOL/L (ref 21–32)
CREAT SERPL-MCNC: 0.94 MG/DL (ref 0.6–1.3)
CREAT UR-MCNC: 69 MG/DL
EOSINOPHIL # BLD AUTO: 0.08 THOUSAND/ΜL (ref 0–0.61)
EOSINOPHIL NFR BLD AUTO: 2 % (ref 0–6)
ERYTHROCYTE [DISTWIDTH] IN BLOOD BY AUTOMATED COUNT: 11.9 % (ref 11.6–15.1)
GFR SERPL CREATININE-BSD FRML MDRD: 80 ML/MIN/1.73SQ M
GLUCOSE P FAST SERPL-MCNC: 333 MG/DL (ref 65–99)
HCT VFR BLD AUTO: 47.4 % (ref 36.5–49.3)
HDLC SERPL-MCNC: 51 MG/DL (ref 40–60)
HGB BLD-MCNC: 15.7 G/DL (ref 12–17)
IMM GRANULOCYTES # BLD AUTO: 0.02 THOUSAND/UL (ref 0–0.2)
IMM GRANULOCYTES NFR BLD AUTO: 0 % (ref 0–2)
LDLC SERPL CALC-MCNC: 153 MG/DL (ref 0–100)
LYMPHOCYTES # BLD AUTO: 1.74 THOUSANDS/ΜL (ref 0.6–4.47)
LYMPHOCYTES NFR BLD AUTO: 35 % (ref 14–44)
MCH RBC QN AUTO: 30.1 PG (ref 26.8–34.3)
MCHC RBC AUTO-ENTMCNC: 33.1 G/DL (ref 31.4–37.4)
MCV RBC AUTO: 91 FL (ref 82–98)
MICROALBUMIN UR-MCNC: 13.7 MG/L (ref 0–20)
MICROALBUMIN/CREAT 24H UR: 20 MG/G CREATININE (ref 0–30)
MONOCYTES # BLD AUTO: 0.35 THOUSAND/ΜL (ref 0.17–1.22)
MONOCYTES NFR BLD AUTO: 7 % (ref 4–12)
NEUTROPHILS # BLD AUTO: 2.8 THOUSANDS/ΜL (ref 1.85–7.62)
NEUTS SEG NFR BLD AUTO: 55 % (ref 43–75)
NRBC BLD AUTO-RTO: 0 /100 WBCS
PLATELET # BLD AUTO: 197 THOUSANDS/UL (ref 149–390)
PMV BLD AUTO: 11.7 FL (ref 8.9–12.7)
POTASSIUM SERPL-SCNC: 4.3 MMOL/L (ref 3.5–5.3)
PROT SERPL-MCNC: 7.4 G/DL (ref 6.4–8.2)
RBC # BLD AUTO: 5.21 MILLION/UL (ref 3.88–5.62)
SL AMB POCT HEMOGLOBIN AIC: 13.7
SODIUM SERPL-SCNC: 134 MMOL/L (ref 136–145)
TRIGL SERPL-MCNC: 254 MG/DL
WBC # BLD AUTO: 5.02 THOUSAND/UL (ref 4.31–10.16)

## 2018-09-06 PROCEDURE — 83036 HEMOGLOBIN GLYCOSYLATED A1C: CPT | Performed by: FAMILY MEDICINE

## 2018-09-06 PROCEDURE — 99214 OFFICE O/P EST MOD 30 MIN: CPT | Performed by: FAMILY MEDICINE

## 2018-09-06 PROCEDURE — 85025 COMPLETE CBC W/AUTO DIFF WBC: CPT | Performed by: FAMILY MEDICINE

## 2018-09-06 PROCEDURE — 36415 COLL VENOUS BLD VENIPUNCTURE: CPT | Performed by: FAMILY MEDICINE

## 2018-09-06 PROCEDURE — 1160F RVW MEDS BY RX/DR IN RCRD: CPT | Performed by: FAMILY MEDICINE

## 2018-09-06 PROCEDURE — 80053 COMPREHEN METABOLIC PANEL: CPT | Performed by: FAMILY MEDICINE

## 2018-09-06 PROCEDURE — 82570 ASSAY OF URINE CREATININE: CPT | Performed by: FAMILY MEDICINE

## 2018-09-06 PROCEDURE — 3046F HEMOGLOBIN A1C LEVEL >9.0%: CPT | Performed by: FAMILY MEDICINE

## 2018-09-06 PROCEDURE — 3061F NEG MICROALBUMINURIA REV: CPT | Performed by: FAMILY MEDICINE

## 2018-09-06 PROCEDURE — 80061 LIPID PANEL: CPT | Performed by: FAMILY MEDICINE

## 2018-09-06 PROCEDURE — 3725F SCREEN DEPRESSION PERFORMED: CPT | Performed by: FAMILY MEDICINE

## 2018-09-06 PROCEDURE — 3008F BODY MASS INDEX DOCD: CPT | Performed by: FAMILY MEDICINE

## 2018-09-06 PROCEDURE — 82043 UR ALBUMIN QUANTITATIVE: CPT | Performed by: FAMILY MEDICINE

## 2018-09-06 PROCEDURE — 1036F TOBACCO NON-USER: CPT | Performed by: FAMILY MEDICINE

## 2018-09-06 RX ORDER — PIOGLITAZONEHYDROCHLORIDE 15 MG/1
15 TABLET ORAL DAILY
COMMUNITY
End: 2019-03-22 | Stop reason: SDUPTHER

## 2018-09-06 NOTE — PROGRESS NOTES
Assessment/Plan:    DMII (diabetes mellitus, type 2) ([facility]  Lab Results   Component Value Date    HGBA1C 13 7 09/06/2018       No results for input(s): POCGLU in the last 72 hours  Diabetes is under poor control  We most likely from medication noncompliance  Recommend recheck in 3 months  Recommended better compliance with medication  If A1c continues to be elevated recommend consideration for daily dosing of insulin as well  Blood Sugar Average: Last 72 hrs:      Essential hypertension  Blood pressure is under good control  Recommend recheck in 3-4 months  Dyslipidemia  Symptoms under good control  Await lipid panel  Start statin if LDL greater than 100  Diagnoses and all orders for this visit:    DM type 2, goal HbA1c < 7% ([facility])  -     POCT hemoglobin A1c  -     metFORMIN (GLUCOPHAGE) 1000 MG tablet; Take 1 tablet (1,000 mg total) by mouth 2 (two) times a day with meals  -     CBC and differential  -     Comprehensive metabolic panel  -     Lipid Panel with Direct LDL reflex  -     Microalbumin / creatinine urine ratio    Essential hypertension  -     metFORMIN (GLUCOPHAGE) 1000 MG tablet; Take 1 tablet (1,000 mg total) by mouth 2 (two) times a day with meals  -     CBC and differential  -     Comprehensive metabolic panel  -     Lipid Panel with Direct LDL reflex  -     Microalbumin / creatinine urine ratio    Type 2 diabetes mellitus without complication, without long-term current use of insulin ([facility])  -     metFORMIN (GLUCOPHAGE) 1000 MG tablet; Take 1 tablet (1,000 mg total) by mouth 2 (two) times a day with meals  -     CBC and differential  -     Comprehensive metabolic panel  -     Lipid Panel with Direct LDL reflex  -     Microalbumin / creatinine urine ratio    Dyslipidemia  -     metFORMIN (GLUCOPHAGE) 1000 MG tablet;  Take 1 tablet (1,000 mg total) by mouth 2 (two) times a day with meals  -     CBC and differential  -     Comprehensive metabolic panel  -     Lipid Panel with Direct LDL reflex  -     Microalbumin / creatinine urine ratio    Other orders  -     pioglitazone (ACTOS) 15 mg tablet; Take 15 mg by mouth daily          Subjective:      Patient ID: Betina Martinez is a 68 y o  male  Patient here for recheck on chronic medical conditions  He states he was without his medication for a while, possibly up to 2 months  He recently restarted his medication  He also is trying to get in to see a dentist   He notes some weight loss over the last 1-2 months but is feeling well  The following portions of the patient's history were reviewed and updated as appropriate: allergies, current medications, past family history, past medical history, past social history, past surgical history and problem list     Review of Systems   Constitutional: Negative  HENT: Negative  Eyes: Negative  Respiratory: Negative  Cardiovascular: Negative  Gastrointestinal: Negative  Endocrine: Negative  Genitourinary: Negative  Musculoskeletal: Negative  Skin: Negative  Allergic/Immunologic: Negative  Neurological: Negative  Hematological: Negative  Psychiatric/Behavioral: Negative  Objective:      /90 (BP Location: Left arm, Patient Position: Sitting, Cuff Size: Standard)   Pulse 68   Temp 97 6 °F (36 4 °C) (Tympanic)   Ht 5' 4 37" (1 635 m)   Wt 88 kg (194 lb)   SpO2 97%   BMI 32 92 kg/m²          Physical Exam   Constitutional: He is oriented to person, place, and time  He appears well-developed and well-nourished  HENT:   Head: Normocephalic and atraumatic  Right Ear: External ear normal  Tympanic membrane is not erythematous and not bulging  Left Ear: External ear normal  Tympanic membrane is not erythematous and not bulging  Nose: Nose normal    Mouth/Throat: Oropharynx is clear and moist and mucous membranes are normal  No oral lesions  No oropharyngeal exudate  Eyes: Conjunctivae and EOM are normal  Right eye exhibits no discharge   Left eye exhibits no discharge  No scleral icterus  Neck: Normal range of motion  Neck supple  No thyromegaly present  Cardiovascular: Normal rate, regular rhythm and normal heart sounds  Exam reveals no gallop and no friction rub  Pulses are no weak pulses  No murmur heard  Pulses:       Dorsalis pedis pulses are 2+ on the right side, and 2+ on the left side  Posterior tibial pulses are 2+ on the right side, and 2+ on the left side  Pulmonary/Chest: Effort normal  No respiratory distress  He has no wheezes  He has no rales  He exhibits no tenderness  Abdominal: Soft  Bowel sounds are normal  He exhibits no distension and no mass  There is no tenderness  There is no rebound and no guarding  Musculoskeletal: Normal range of motion  He exhibits no edema, tenderness or deformity  Feet:   Right Foot:   Skin Integrity: Negative for ulcer, skin breakdown, erythema, warmth, callus or dry skin  Left Foot:   Skin Integrity: Negative for ulcer, skin breakdown, erythema, warmth, callus or dry skin  Lymphadenopathy:     He has no cervical adenopathy  Neurological: He is alert and oriented to person, place, and time  He has normal reflexes  No cranial nerve deficit  He exhibits normal muscle tone  Coordination normal    Skin: Skin is warm and dry  No rash noted  No erythema  No pallor  Psychiatric: He has a normal mood and affect  His behavior is normal    Vitals reviewed  Right Foot/Ankle   Right Foot Inspection  Skin Exam: skin normal and skin intact no dry skin, no warmth, no callus, no erythema, no maceration, no abnormal color, no pre-ulcer, no ulcer and no callus                          Toe Exam: ROM and strength within normal limits  Sensory   Vibration: intact  Proprioception: intact   Monofilament testing: intact  Vascular  Capillary refills: < 3 seconds  The right DP pulse is 2+  The right PT pulse is 2+       Left Foot/Ankle  Left Foot Inspection  Skin Exam: skin normal and skin intactno dry skin, no warmth, no erythema, no maceration, normal color, no pre-ulcer, no ulcer and no callus                         Toe Exam: ROM and strength within normal limits                   Sensory   Vibration: intact  Proprioception: intact  Monofilament: intact  Vascular  Capillary refills: < 3 seconds  The left DP pulse is 2+  The left PT pulse is 2+  Assign Risk Category:  No deformity present; No loss of protective sensation;  No weak pulses       Risk: 0

## 2018-09-06 NOTE — ASSESSMENT & PLAN NOTE
Lab Results   Component Value Date    HGBA1C 13 7 09/06/2018       No results for input(s): POCGLU in the last 72 hours  Diabetes is under poor control  We most likely from medication noncompliance  Recommend recheck in 3 months  Recommended better compliance with medication  If A1c continues to be elevated recommend consideration for daily dosing of insulin as well    Blood Sugar Average: Last 72 hrs:

## 2018-09-17 ENCOUNTER — TELEPHONE (OUTPATIENT)
Dept: FAMILY MEDICINE CLINIC | Facility: CLINIC | Age: 73
End: 2018-09-17

## 2018-09-19 NOTE — TELEPHONE ENCOUNTER
Blood testing shows that diabetes is under poor control  Recommend taking his medication on a regular basis and recheck in the office in 2-3 months

## 2019-02-20 ENCOUNTER — OFFICE VISIT (OUTPATIENT)
Dept: FAMILY MEDICINE CLINIC | Facility: CLINIC | Age: 74
End: 2019-02-20
Payer: COMMERCIAL

## 2019-02-20 ENCOUNTER — TELEPHONE (OUTPATIENT)
Dept: FAMILY MEDICINE CLINIC | Facility: CLINIC | Age: 74
End: 2019-02-20

## 2019-02-20 VITALS
WEIGHT: 186 LBS | HEART RATE: 58 BPM | HEIGHT: 64 IN | BODY MASS INDEX: 31.76 KG/M2 | DIASTOLIC BLOOD PRESSURE: 80 MMHG | SYSTOLIC BLOOD PRESSURE: 134 MMHG | TEMPERATURE: 95.2 F | OXYGEN SATURATION: 98 %

## 2019-02-20 DIAGNOSIS — Z00.00 ROUTINE ADULT HEALTH MAINTENANCE: ICD-10-CM

## 2019-02-20 DIAGNOSIS — I10 ESSENTIAL HYPERTENSION: ICD-10-CM

## 2019-02-20 DIAGNOSIS — E11.9 TYPE 2 DIABETES MELLITUS WITHOUT COMPLICATION, WITHOUT LONG-TERM CURRENT USE OF INSULIN (HCC): Primary | ICD-10-CM

## 2019-02-20 LAB — SL AMB POCT HEMOGLOBIN AIC: 13.8 (ref ?–6.5)

## 2019-02-20 PROCEDURE — 3079F DIAST BP 80-89 MM HG: CPT | Performed by: FAMILY MEDICINE

## 2019-02-20 PROCEDURE — 4010F ACE/ARB THERAPY RXD/TAKEN: CPT | Performed by: FAMILY MEDICINE

## 2019-02-20 PROCEDURE — 3046F HEMOGLOBIN A1C LEVEL >9.0%: CPT | Performed by: FAMILY MEDICINE

## 2019-02-20 PROCEDURE — 3008F BODY MASS INDEX DOCD: CPT | Performed by: FAMILY MEDICINE

## 2019-02-20 PROCEDURE — 3075F SYST BP GE 130 - 139MM HG: CPT | Performed by: FAMILY MEDICINE

## 2019-02-20 PROCEDURE — 83036 HEMOGLOBIN GLYCOSYLATED A1C: CPT | Performed by: FAMILY MEDICINE

## 2019-02-20 PROCEDURE — 99214 OFFICE O/P EST MOD 30 MIN: CPT | Performed by: FAMILY MEDICINE

## 2019-02-20 PROCEDURE — 1036F TOBACCO NON-USER: CPT | Performed by: FAMILY MEDICINE

## 2019-02-20 PROCEDURE — 1160F RVW MEDS BY RX/DR IN RCRD: CPT | Performed by: FAMILY MEDICINE

## 2019-02-20 RX ORDER — SIMVASTATIN 20 MG
20 TABLET ORAL
Qty: 90 TABLET | Refills: 1 | Status: SHIPPED | OUTPATIENT
Start: 2019-02-20 | End: 2019-10-02 | Stop reason: SDUPTHER

## 2019-02-20 RX ORDER — LISINOPRIL 5 MG/1
5 TABLET ORAL DAILY
Qty: 90 TABLET | Refills: 1 | Status: SHIPPED | OUTPATIENT
Start: 2019-02-20 | End: 2019-06-19 | Stop reason: SDUPTHER

## 2019-02-20 RX ORDER — BLOOD SUGAR DIAGNOSTIC
STRIP MISCELLANEOUS
Refills: 0 | COMMUNITY
Start: 2018-12-07 | End: 2019-08-01 | Stop reason: SDUPTHER

## 2019-02-20 NOTE — PROGRESS NOTES
Assessment/Plan:  As noted patient is having compliance issues with medication  His A1c is likely elevated secondary to severe noncompliance  We spoke with patient today and discussed having a home nursing come and help him with taking medications on a regular basis  Patient is agreeable to doing so  My hope is we can get his blood glucose readings under better control and he will follow up with dentist as well  Recommend recheck in office again in 1-2 months  Sooner if needed  He also is not on an ACE-inhibitor probe was previously prescribed this  Low-dose ACE-inhibitor also sent  He also has been noncompliant on simvastatin  New prescription sent  Time spent counseling 15 of the 25 min visit  Diagnoses and all orders for this visit:    Type 2 diabetes mellitus without complication, without long-term current use of insulin ([facility])  -     simvastatin (ZOCOR) 20 mg tablet; Take 1 tablet (20 mg total) by mouth daily at bedtime  -     Ambulatory Referral to [address] [place] [address] John Jara; Future    Routine adult health maintenance  -     POCT hemoglobin A1c    Essential hypertension  -     lisinopril (ZESTRIL) 5 mg tablet; Take 1 tablet (5 mg total) by mouth daily    Other orders  -     ACCU-CHEK ENZO PLUS test strip; TEST 3 TIMES A DAY          Subjective:      Patient ID: Miranda Martinez is a 76 y o  male  Patient is here for recheck on diabetes  His A1c continues to be very high  He recently saw his dentist who stated they would not do any dental work or dental extraction with his sugars being elevated  He states he checked his blood sugars over the last few days and they are in the high 200s to mid 300s    Patient is currently on pioglitazone as well as metformin and glipizide  He does have his medications with him  His bottles are nearly full and these bottles were originally filled in early 2018   Initially patient stated he was taking medication regularly but then admitted he is not taking medication regularly  There may be some difficulty with reading or understanding how to take the medication at home          The following portions of the patient's history were reviewed and updated as appropriate: allergies, current medications, past family history, past medical history, past social history, past surgical history and problem list     Review of Systems      Objective:      /80 (BP Location: Left arm, Patient Position: Sitting, Cuff Size: Large)   Pulse 58   Temp (!) 95 2 °F (35 1 °C) (Tympanic)   Ht 5' 4 17" (1 63 m)   Wt 84 4 kg (186 lb)   SpO2 98%   BMI 31 75 kg/m²          Physical Exam

## 2019-02-20 NOTE — TELEPHONE ENCOUNTER
I need a  or home health care nurse to go out and review medications with patient  He is severely uncontrolled with his diabetes and he has full bottles of medications that were prescribed a urine a half ago  His A1c is over 13  Ideally if someone could go out weekly for a few weeks to review his medications with him and to be sure he is taking them properly

## 2019-02-20 NOTE — TELEPHONE ENCOUNTER
[facility] states that an order was put in for a   Patient is not being seen by home health  They can't just send a   Want clarification on what is anna ordered  Please advise

## 2019-02-21 NOTE — TELEPHONE ENCOUNTER
Pat from [address] called, and I read to her what you stated that you want for pt and she states for that you need an order for skilled nursing, as social workers do not do medication  She also states that the order for the  needs to be cancelled, and this needs to be stated in the order  She would like a call when order is placed  Her phone #[phone]  Please advise  Thank you

## 2019-02-22 DIAGNOSIS — E11.9 TYPE 2 DIABETES MELLITUS WITHOUT COMPLICATION, WITHOUT LONG-TERM CURRENT USE OF INSULIN (HCC): Primary | ICD-10-CM

## 2019-03-22 DIAGNOSIS — E11.65 TYPE 2 DIABETES MELLITUS WITH HYPERGLYCEMIA, WITHOUT LONG-TERM CURRENT USE OF INSULIN (HCC): Primary | ICD-10-CM

## 2019-03-22 RX ORDER — LANCETS
EACH MISCELLANEOUS
Qty: 100 EACH | Refills: 3 | Status: SHIPPED | OUTPATIENT
Start: 2019-03-22 | End: 2019-10-12 | Stop reason: SDUPTHER

## 2019-03-22 RX ORDER — PIOGLITAZONEHYDROCHLORIDE 15 MG/1
TABLET ORAL
Qty: 90 TABLET | Refills: 3 | Status: SHIPPED | OUTPATIENT
Start: 2019-03-22 | End: 2019-05-06

## 2019-03-26 ENCOUNTER — TELEPHONE (OUTPATIENT)
Dept: FAMILY MEDICINE CLINIC | Facility: CLINIC | Age: 74
End: 2019-03-26

## 2019-03-26 NOTE — TELEPHONE ENCOUNTER
[address], called to report she is discharging pt  She wanted to give an FYI that is A1C levels have been much better

## 2019-05-06 ENCOUNTER — OFFICE VISIT (OUTPATIENT)
Dept: ENDOCRINOLOGY | Facility: CLINIC | Age: 74
End: 2019-05-06
Payer: COMMERCIAL

## 2019-05-06 VITALS
WEIGHT: 195.4 LBS | HEART RATE: 67 BPM | SYSTOLIC BLOOD PRESSURE: 140 MMHG | DIASTOLIC BLOOD PRESSURE: 92 MMHG | BODY MASS INDEX: 33.36 KG/M2 | HEIGHT: 64 IN

## 2019-05-06 DIAGNOSIS — E66.09 CLASS 1 OBESITY DUE TO EXCESS CALORIES WITH SERIOUS COMORBIDITY AND BODY MASS INDEX (BMI) OF 32.0 TO 32.9 IN ADULT: ICD-10-CM

## 2019-05-06 DIAGNOSIS — E11.65 TYPE 2 DIABETES MELLITUS WITH HYPERGLYCEMIA, WITHOUT LONG-TERM CURRENT USE OF INSULIN (HCC): Primary | ICD-10-CM

## 2019-05-06 DIAGNOSIS — E78.5 DYSLIPIDEMIA: ICD-10-CM

## 2019-05-06 DIAGNOSIS — I10 ESSENTIAL HYPERTENSION: ICD-10-CM

## 2019-05-06 PROCEDURE — 99214 OFFICE O/P EST MOD 30 MIN: CPT | Performed by: INTERNAL MEDICINE

## 2019-05-07 ENCOUNTER — LAB (OUTPATIENT)
Dept: LAB | Facility: HOSPITAL | Age: 74
End: 2019-05-07
Attending: INTERNAL MEDICINE
Payer: COMMERCIAL

## 2019-05-07 DIAGNOSIS — I10 ESSENTIAL HYPERTENSION: ICD-10-CM

## 2019-05-07 DIAGNOSIS — E78.5 DYSLIPIDEMIA: ICD-10-CM

## 2019-05-07 DIAGNOSIS — E11.65 TYPE 2 DIABETES MELLITUS WITH HYPERGLYCEMIA, WITHOUT LONG-TERM CURRENT USE OF INSULIN (HCC): ICD-10-CM

## 2019-05-07 LAB
ALBUMIN SERPL BCP-MCNC: 3.4 G/DL (ref 3.5–5)
ALP SERPL-CCNC: 71 U/L (ref 46–116)
ALT SERPL W P-5'-P-CCNC: 33 U/L (ref 12–78)
ANION GAP SERPL CALCULATED.3IONS-SCNC: 3 MMOL/L (ref 4–13)
AST SERPL W P-5'-P-CCNC: 15 U/L (ref 5–45)
BILIRUB SERPL-MCNC: 0.44 MG/DL (ref 0.2–1)
BUN SERPL-MCNC: 19 MG/DL (ref 5–25)
CALCIUM SERPL-MCNC: 9.8 MG/DL (ref 8.3–10.1)
CHLORIDE SERPL-SCNC: 102 MMOL/L (ref 100–108)
CHOLEST SERPL-MCNC: 164 MG/DL (ref 50–200)
CO2 SERPL-SCNC: 29 MMOL/L (ref 21–32)
CREAT SERPL-MCNC: 0.96 MG/DL (ref 0.6–1.3)
CREAT UR-MCNC: 127 MG/DL
EST. AVERAGE GLUCOSE BLD GHB EST-MCNC: 263 MG/DL
GFR SERPL CREATININE-BSD FRML MDRD: 78 ML/MIN/1.73SQ M
GLUCOSE P FAST SERPL-MCNC: 249 MG/DL (ref 65–99)
HBA1C MFR BLD: 10.8 % (ref 4.2–6.3)
HDLC SERPL-MCNC: 54 MG/DL (ref 40–60)
LDLC SERPL CALC-MCNC: 82 MG/DL (ref 0–100)
MICROALBUMIN UR-MCNC: 19.8 MG/L (ref 0–20)
MICROALBUMIN/CREAT 24H UR: 16 MG/G CREATININE (ref 0–30)
POTASSIUM SERPL-SCNC: 4.3 MMOL/L (ref 3.5–5.3)
PROT SERPL-MCNC: 7.1 G/DL (ref 6.4–8.2)
SODIUM SERPL-SCNC: 134 MMOL/L (ref 136–145)
T4 FREE SERPL-MCNC: 1.2 NG/DL (ref 0.76–1.46)
TRIGL SERPL-MCNC: 140 MG/DL
TSH SERPL DL<=0.05 MIU/L-ACNC: 0.85 UIU/ML (ref 0.36–3.74)

## 2019-05-07 PROCEDURE — 84439 ASSAY OF FREE THYROXINE: CPT

## 2019-05-07 PROCEDURE — 84443 ASSAY THYROID STIM HORMONE: CPT

## 2019-05-07 PROCEDURE — 82043 UR ALBUMIN QUANTITATIVE: CPT

## 2019-05-07 PROCEDURE — 36415 COLL VENOUS BLD VENIPUNCTURE: CPT

## 2019-05-07 PROCEDURE — 82570 ASSAY OF URINE CREATININE: CPT

## 2019-05-07 PROCEDURE — 83036 HEMOGLOBIN GLYCOSYLATED A1C: CPT

## 2019-05-07 PROCEDURE — 80053 COMPREHEN METABOLIC PANEL: CPT

## 2019-05-07 PROCEDURE — 80061 LIPID PANEL: CPT

## 2019-05-07 PROCEDURE — 3061F NEG MICROALBUMINURIA REV: CPT | Performed by: FAMILY MEDICINE

## 2019-05-09 ENCOUNTER — TELEPHONE (OUTPATIENT)
Dept: DIABETES SERVICES | Facility: CLINIC | Age: 74
End: 2019-05-09

## 2019-05-09 ENCOUNTER — TELEPHONE (OUTPATIENT)
Dept: ENDOCRINOLOGY | Facility: CLINIC | Age: 74
End: 2019-05-09

## 2019-05-20 ENCOUNTER — OFFICE VISIT (OUTPATIENT)
Dept: FAMILY MEDICINE CLINIC | Facility: CLINIC | Age: 74
End: 2019-05-20
Payer: COMMERCIAL

## 2019-05-20 VITALS
HEART RATE: 68 BPM | SYSTOLIC BLOOD PRESSURE: 126 MMHG | TEMPERATURE: 97.8 F | DIASTOLIC BLOOD PRESSURE: 80 MMHG | OXYGEN SATURATION: 98 %

## 2019-05-20 DIAGNOSIS — I10 ESSENTIAL HYPERTENSION: ICD-10-CM

## 2019-05-20 DIAGNOSIS — E78.5 DYSLIPIDEMIA: ICD-10-CM

## 2019-05-20 DIAGNOSIS — N40.0 BENIGN PROSTATIC HYPERPLASIA WITHOUT LOWER URINARY TRACT SYMPTOMS: ICD-10-CM

## 2019-05-20 DIAGNOSIS — Z00.00 MEDICARE ANNUAL WELLNESS VISIT, SUBSEQUENT: Primary | ICD-10-CM

## 2019-05-20 DIAGNOSIS — E11.9 TYPE 2 DIABETES MELLITUS WITHOUT COMPLICATION, WITHOUT LONG-TERM CURRENT USE OF INSULIN (HCC): ICD-10-CM

## 2019-05-20 PROCEDURE — 1170F FXNL STATUS ASSESSED: CPT | Performed by: FAMILY MEDICINE

## 2019-05-20 PROCEDURE — 99214 OFFICE O/P EST MOD 30 MIN: CPT | Performed by: FAMILY MEDICINE

## 2019-05-20 PROCEDURE — G0439 PPPS, SUBSEQ VISIT: HCPCS | Performed by: FAMILY MEDICINE

## 2019-05-20 PROCEDURE — 3074F SYST BP LT 130 MM HG: CPT | Performed by: FAMILY MEDICINE

## 2019-05-20 PROCEDURE — 1125F AMNT PAIN NOTED PAIN PRSNT: CPT | Performed by: FAMILY MEDICINE

## 2019-05-20 PROCEDURE — 3079F DIAST BP 80-89 MM HG: CPT | Performed by: FAMILY MEDICINE

## 2019-06-19 ENCOUNTER — OFFICE VISIT (OUTPATIENT)
Dept: ENDOCRINOLOGY | Facility: CLINIC | Age: 74
End: 2019-06-19
Payer: COMMERCIAL

## 2019-06-19 VITALS
DIASTOLIC BLOOD PRESSURE: 80 MMHG | HEIGHT: 64 IN | SYSTOLIC BLOOD PRESSURE: 150 MMHG | WEIGHT: 203.2 LBS | HEART RATE: 67 BPM | BODY MASS INDEX: 34.69 KG/M2

## 2019-06-19 DIAGNOSIS — E66.09 CLASS 1 OBESITY DUE TO EXCESS CALORIES WITH SERIOUS COMORBIDITY AND BODY MASS INDEX (BMI) OF 32.0 TO 32.9 IN ADULT: ICD-10-CM

## 2019-06-19 DIAGNOSIS — I10 ESSENTIAL HYPERTENSION: ICD-10-CM

## 2019-06-19 DIAGNOSIS — Z79.4 TYPE 2 DIABETES MELLITUS WITH HYPERGLYCEMIA, WITH LONG-TERM CURRENT USE OF INSULIN (HCC): Primary | ICD-10-CM

## 2019-06-19 DIAGNOSIS — E11.65 TYPE 2 DIABETES MELLITUS WITH HYPERGLYCEMIA, WITH LONG-TERM CURRENT USE OF INSULIN (HCC): Primary | ICD-10-CM

## 2019-06-19 DIAGNOSIS — E78.5 DYSLIPIDEMIA: ICD-10-CM

## 2019-06-19 DIAGNOSIS — E11.65 TYPE 2 DIABETES MELLITUS WITH HYPERGLYCEMIA, WITHOUT LONG-TERM CURRENT USE OF INSULIN (HCC): ICD-10-CM

## 2019-06-19 PROCEDURE — 99214 OFFICE O/P EST MOD 30 MIN: CPT | Performed by: INTERNAL MEDICINE

## 2019-06-19 PROCEDURE — 4010F ACE/ARB THERAPY RXD/TAKEN: CPT | Performed by: FAMILY MEDICINE

## 2019-06-19 RX ORDER — LISINOPRIL 5 MG/1
5 TABLET ORAL DAILY
Qty: 90 TABLET | Refills: 1 | Status: SHIPPED | OUTPATIENT
Start: 2019-06-19 | End: 2020-08-13

## 2019-08-01 DIAGNOSIS — E11.9 TYPE 2 DIABETES MELLITUS WITHOUT COMPLICATION, WITHOUT LONG-TERM CURRENT USE OF INSULIN (HCC): Primary | ICD-10-CM

## 2019-08-01 RX ORDER — BLOOD SUGAR DIAGNOSTIC
STRIP MISCELLANEOUS
Qty: 250 EACH | Refills: 0 | Status: SHIPPED | OUTPATIENT
Start: 2019-08-01 | End: 2019-10-21 | Stop reason: SDUPTHER

## 2019-10-02 DIAGNOSIS — E11.9 TYPE 2 DIABETES MELLITUS WITHOUT COMPLICATION, WITHOUT LONG-TERM CURRENT USE OF INSULIN (HCC): ICD-10-CM

## 2019-10-03 RX ORDER — SIMVASTATIN 20 MG
TABLET ORAL
Qty: 90 TABLET | Refills: 1 | Status: SHIPPED | OUTPATIENT
Start: 2019-10-03 | End: 2020-04-09

## 2019-10-12 DIAGNOSIS — E11.65 TYPE 2 DIABETES MELLITUS WITH HYPERGLYCEMIA, WITHOUT LONG-TERM CURRENT USE OF INSULIN (HCC): ICD-10-CM

## 2019-10-13 RX ORDER — LANCETS
EACH MISCELLANEOUS
Qty: 100 EACH | Refills: 3 | Status: SHIPPED | OUTPATIENT
Start: 2019-10-13 | End: 2020-07-27

## 2019-10-21 DIAGNOSIS — E11.9 TYPE 2 DIABETES MELLITUS WITHOUT COMPLICATION, WITHOUT LONG-TERM CURRENT USE OF INSULIN (HCC): ICD-10-CM

## 2019-10-22 RX ORDER — BLOOD SUGAR DIAGNOSTIC
STRIP MISCELLANEOUS
Qty: 250 EACH | Refills: 0 | Status: SHIPPED | OUTPATIENT
Start: 2019-10-22 | End: 2020-01-19

## 2019-11-21 ENCOUNTER — TELEPHONE (OUTPATIENT)
Dept: FAMILY MEDICINE CLINIC | Facility: CLINIC | Age: 74
End: 2019-11-21

## 2019-12-27 NOTE — TELEPHONE ENCOUNTER
At request of physician, patient was sent a dismissal letter, dated 12/9/19 due to frequency of no shows  Patient will be cared for for 30 days and then dismissed  Certified letter rec't received by someone at patient's address (signature unreadable) dated 12/13/19

## 2020-01-18 DIAGNOSIS — E11.65 TYPE 2 DIABETES MELLITUS WITH HYPERGLYCEMIA, WITHOUT LONG-TERM CURRENT USE OF INSULIN (HCC): ICD-10-CM

## 2020-01-18 DIAGNOSIS — E11.9 TYPE 2 DIABETES MELLITUS WITHOUT COMPLICATION, WITHOUT LONG-TERM CURRENT USE OF INSULIN (HCC): ICD-10-CM

## 2020-01-18 RX ORDER — INSULIN GLARGINE 100 [IU]/ML
INJECTION, SOLUTION SUBCUTANEOUS
Qty: 15 ML | Refills: 0 | Status: SHIPPED | OUTPATIENT
Start: 2020-01-18 | End: 2020-02-27

## 2020-01-19 RX ORDER — BLOOD SUGAR DIAGNOSTIC
STRIP MISCELLANEOUS
Qty: 250 EACH | Refills: 0 | Status: SHIPPED | OUTPATIENT
Start: 2020-01-19 | End: 2020-09-01

## 2020-02-27 DIAGNOSIS — E11.65 TYPE 2 DIABETES MELLITUS WITH HYPERGLYCEMIA, WITHOUT LONG-TERM CURRENT USE OF INSULIN (HCC): ICD-10-CM

## 2020-02-27 RX ORDER — INSULIN ASPART 100 [IU]/ML
INJECTION, SOLUTION INTRAVENOUS; SUBCUTANEOUS
Qty: 15 ML | Refills: 1 | Status: SHIPPED | OUTPATIENT
Start: 2020-02-27 | End: 2020-05-15

## 2020-02-27 RX ORDER — INSULIN GLARGINE 100 [IU]/ML
INJECTION, SOLUTION SUBCUTANEOUS
Qty: 15 ML | Refills: 0 | Status: SHIPPED | OUTPATIENT
Start: 2020-02-27 | End: 2020-03-31

## 2020-03-31 DIAGNOSIS — E11.65 TYPE 2 DIABETES MELLITUS WITH HYPERGLYCEMIA, WITHOUT LONG-TERM CURRENT USE OF INSULIN (HCC): ICD-10-CM

## 2020-03-31 RX ORDER — INSULIN GLARGINE 100 [IU]/ML
INJECTION, SOLUTION SUBCUTANEOUS
Qty: 15 ML | Refills: 0 | Status: SHIPPED | OUTPATIENT
Start: 2020-03-31 | End: 2020-05-13

## 2020-04-09 DIAGNOSIS — E11.9 TYPE 2 DIABETES MELLITUS WITHOUT COMPLICATION, WITHOUT LONG-TERM CURRENT USE OF INSULIN (HCC): ICD-10-CM

## 2020-04-09 RX ORDER — SIMVASTATIN 20 MG
TABLET ORAL
Qty: 90 TABLET | Refills: 1 | Status: SHIPPED | OUTPATIENT
Start: 2020-04-09 | End: 2020-10-21 | Stop reason: SDUPTHER

## 2020-04-30 DIAGNOSIS — E11.65 TYPE 2 DIABETES MELLITUS WITH HYPERGLYCEMIA, WITHOUT LONG-TERM CURRENT USE OF INSULIN (HCC): ICD-10-CM

## 2020-04-30 RX ORDER — PEN NEEDLE, DIABETIC 32GX 5/32"
NEEDLE, DISPOSABLE MISCELLANEOUS
Qty: 100 EACH | Refills: 3 | Status: SHIPPED | OUTPATIENT
Start: 2020-04-30 | End: 2020-08-13

## 2020-05-13 DIAGNOSIS — E11.65 TYPE 2 DIABETES MELLITUS WITH HYPERGLYCEMIA, WITHOUT LONG-TERM CURRENT USE OF INSULIN (HCC): ICD-10-CM

## 2020-05-13 RX ORDER — INSULIN GLARGINE 100 [IU]/ML
INJECTION, SOLUTION SUBCUTANEOUS
Qty: 15 ML | Refills: 0 | Status: SHIPPED | OUTPATIENT
Start: 2020-05-13 | End: 2020-06-22

## 2020-05-15 DIAGNOSIS — E11.65 TYPE 2 DIABETES MELLITUS WITH HYPERGLYCEMIA, WITHOUT LONG-TERM CURRENT USE OF INSULIN (HCC): ICD-10-CM

## 2020-05-15 RX ORDER — INSULIN ASPART 100 [IU]/ML
INJECTION, SOLUTION INTRAVENOUS; SUBCUTANEOUS
Qty: 15 ML | Refills: 1 | Status: SHIPPED | OUTPATIENT
Start: 2020-05-15 | End: 2020-07-27

## 2020-06-22 DIAGNOSIS — E11.65 TYPE 2 DIABETES MELLITUS WITH HYPERGLYCEMIA, WITHOUT LONG-TERM CURRENT USE OF INSULIN (HCC): ICD-10-CM

## 2020-06-22 RX ORDER — INSULIN GLARGINE 100 [IU]/ML
INJECTION, SOLUTION SUBCUTANEOUS
Qty: 15 ML | Refills: 0 | Status: SHIPPED | OUTPATIENT
Start: 2020-06-22 | End: 2020-10-21 | Stop reason: SDUPTHER

## 2020-07-27 DIAGNOSIS — E11.65 TYPE 2 DIABETES MELLITUS WITH HYPERGLYCEMIA, WITHOUT LONG-TERM CURRENT USE OF INSULIN (HCC): ICD-10-CM

## 2020-07-27 RX ORDER — LANCETS
EACH MISCELLANEOUS
Qty: 100 EACH | Refills: 3 | Status: SHIPPED | OUTPATIENT
Start: 2020-07-27

## 2020-07-27 RX ORDER — INSULIN ASPART 100 [IU]/ML
INJECTION, SOLUTION INTRAVENOUS; SUBCUTANEOUS
Qty: 15 ML | Refills: 1 | Status: SHIPPED | OUTPATIENT
Start: 2020-07-27 | End: 2020-10-12 | Stop reason: SDUPTHER

## 2020-08-13 DIAGNOSIS — E11.65 TYPE 2 DIABETES MELLITUS WITH HYPERGLYCEMIA, WITHOUT LONG-TERM CURRENT USE OF INSULIN (HCC): ICD-10-CM

## 2020-08-13 DIAGNOSIS — I10 ESSENTIAL HYPERTENSION: ICD-10-CM

## 2020-08-13 RX ORDER — LISINOPRIL 5 MG/1
TABLET ORAL
Qty: 90 TABLET | Refills: 1 | Status: SHIPPED | OUTPATIENT
Start: 2020-08-13 | End: 2020-10-21 | Stop reason: SDUPTHER

## 2020-08-13 RX ORDER — PEN NEEDLE, DIABETIC 32GX 5/32"
NEEDLE, DISPOSABLE MISCELLANEOUS
Qty: 100 EACH | Refills: 3 | Status: SHIPPED | OUTPATIENT
Start: 2020-08-13 | End: 2022-04-08 | Stop reason: SDUPTHER

## 2020-09-01 DIAGNOSIS — E11.9 TYPE 2 DIABETES MELLITUS WITHOUT COMPLICATION, WITHOUT LONG-TERM CURRENT USE OF INSULIN (HCC): ICD-10-CM

## 2020-09-01 RX ORDER — BLOOD SUGAR DIAGNOSTIC
STRIP MISCELLANEOUS
Qty: 250 EACH | Refills: 0 | Status: SHIPPED | OUTPATIENT
Start: 2020-09-01 | End: 2021-08-26 | Stop reason: SDUPTHER

## 2020-09-14 DIAGNOSIS — E11.65 TYPE 2 DIABETES MELLITUS WITH HYPERGLYCEMIA, WITHOUT LONG-TERM CURRENT USE OF INSULIN (HCC): ICD-10-CM

## 2020-09-14 RX ORDER — INSULIN GLARGINE 100 [IU]/ML
INJECTION, SOLUTION SUBCUTANEOUS
Qty: 15 ML | Refills: 0 | OUTPATIENT
Start: 2020-09-14

## 2020-10-10 DIAGNOSIS — E11.65 TYPE 2 DIABETES MELLITUS WITH HYPERGLYCEMIA, WITHOUT LONG-TERM CURRENT USE OF INSULIN (HCC): ICD-10-CM

## 2020-10-11 RX ORDER — INSULIN ASPART 100 [IU]/ML
INJECTION, SOLUTION INTRAVENOUS; SUBCUTANEOUS
Qty: 15 ML | Refills: 1 | OUTPATIENT
Start: 2020-10-11

## 2020-10-12 DIAGNOSIS — E11.65 TYPE 2 DIABETES MELLITUS WITH HYPERGLYCEMIA, WITHOUT LONG-TERM CURRENT USE OF INSULIN (HCC): ICD-10-CM

## 2020-10-12 RX ORDER — INSULIN ASPART 100 [IU]/ML
INJECTION, SOLUTION INTRAVENOUS; SUBCUTANEOUS
Qty: 5 PEN | Refills: 0 | Status: SHIPPED | OUTPATIENT
Start: 2020-10-12 | End: 2020-10-21 | Stop reason: SDUPTHER

## 2020-10-13 DIAGNOSIS — E11.65 TYPE 2 DIABETES MELLITUS WITH HYPERGLYCEMIA, WITHOUT LONG-TERM CURRENT USE OF INSULIN (HCC): ICD-10-CM

## 2020-10-13 RX ORDER — GLIMEPIRIDE 2 MG/1
TABLET ORAL
Qty: 90 TABLET | OUTPATIENT
Start: 2020-10-13

## 2020-10-13 RX ORDER — PIOGLITAZONEHYDROCHLORIDE 15 MG/1
TABLET ORAL
Qty: 90 TABLET | Refills: 3 | OUTPATIENT
Start: 2020-10-13

## 2020-10-21 ENCOUNTER — APPOINTMENT (OUTPATIENT)
Dept: LAB | Facility: CLINIC | Age: 75
End: 2020-10-21
Payer: COMMERCIAL

## 2020-10-21 ENCOUNTER — OFFICE VISIT (OUTPATIENT)
Dept: ENDOCRINOLOGY | Facility: CLINIC | Age: 75
End: 2020-10-21
Payer: COMMERCIAL

## 2020-10-21 VITALS
SYSTOLIC BLOOD PRESSURE: 150 MMHG | BODY MASS INDEX: 33.64 KG/M2 | DIASTOLIC BLOOD PRESSURE: 78 MMHG | TEMPERATURE: 97.6 F | HEIGHT: 62 IN | WEIGHT: 182.8 LBS | HEART RATE: 75 BPM

## 2020-10-21 DIAGNOSIS — E11.9 TYPE 2 DIABETES MELLITUS WITHOUT COMPLICATION, WITHOUT LONG-TERM CURRENT USE OF INSULIN (HCC): ICD-10-CM

## 2020-10-21 DIAGNOSIS — E78.5 DYSLIPIDEMIA: ICD-10-CM

## 2020-10-21 DIAGNOSIS — E11.65 TYPE 2 DIABETES MELLITUS WITH HYPERGLYCEMIA, WITH LONG-TERM CURRENT USE OF INSULIN (HCC): ICD-10-CM

## 2020-10-21 DIAGNOSIS — E11.65 TYPE 2 DIABETES MELLITUS WITH HYPERGLYCEMIA, WITHOUT LONG-TERM CURRENT USE OF INSULIN (HCC): ICD-10-CM

## 2020-10-21 DIAGNOSIS — E55.9 VITAMIN D DEFICIENCY: ICD-10-CM

## 2020-10-21 DIAGNOSIS — I10 ESSENTIAL HYPERTENSION: ICD-10-CM

## 2020-10-21 DIAGNOSIS — E11.9 DM TYPE 2, GOAL HBA1C < 7% (HCC): ICD-10-CM

## 2020-10-21 DIAGNOSIS — Z79.4 TYPE 2 DIABETES MELLITUS WITH HYPERGLYCEMIA, WITH LONG-TERM CURRENT USE OF INSULIN (HCC): ICD-10-CM

## 2020-10-21 DIAGNOSIS — E11.65 TYPE 2 DIABETES MELLITUS WITH HYPERGLYCEMIA, WITHOUT LONG-TERM CURRENT USE OF INSULIN (HCC): Primary | ICD-10-CM

## 2020-10-21 LAB
25(OH)D3 SERPL-MCNC: 20.1 NG/ML (ref 30–100)
ALBUMIN SERPL BCP-MCNC: 3.8 G/DL (ref 3.5–5)
ALP SERPL-CCNC: 102 U/L (ref 46–116)
ALT SERPL W P-5'-P-CCNC: 34 U/L (ref 12–78)
ANION GAP SERPL CALCULATED.3IONS-SCNC: 8 MMOL/L (ref 4–13)
AST SERPL W P-5'-P-CCNC: 14 U/L (ref 5–45)
BILIRUB SERPL-MCNC: 0.55 MG/DL (ref 0.2–1)
BUN SERPL-MCNC: 12 MG/DL (ref 5–25)
CALCIUM SERPL-MCNC: 10.2 MG/DL (ref 8.3–10.1)
CHLORIDE SERPL-SCNC: 100 MMOL/L (ref 100–108)
CHOLEST SERPL-MCNC: 229 MG/DL (ref 50–200)
CO2 SERPL-SCNC: 27 MMOL/L (ref 21–32)
CREAT SERPL-MCNC: 0.96 MG/DL (ref 0.6–1.3)
CREAT UR-MCNC: 80.3 MG/DL
EST. AVERAGE GLUCOSE BLD GHB EST-MCNC: 289 MG/DL
GFR SERPL CREATININE-BSD FRML MDRD: 77 ML/MIN/1.73SQ M
GLUCOSE P FAST SERPL-MCNC: 348 MG/DL (ref 65–99)
HBA1C MFR BLD: 11.7 %
HDLC SERPL-MCNC: 57 MG/DL
LDLC SERPL CALC-MCNC: 143 MG/DL (ref 0–100)
MICROALBUMIN UR-MCNC: 20.3 MG/L (ref 0–20)
MICROALBUMIN/CREAT 24H UR: 25 MG/G CREATININE (ref 0–30)
POTASSIUM SERPL-SCNC: 4.2 MMOL/L (ref 3.5–5.3)
PROT SERPL-MCNC: 7.8 G/DL (ref 6.4–8.2)
SODIUM SERPL-SCNC: 135 MMOL/L (ref 136–145)
T4 FREE SERPL-MCNC: 1.23 NG/DL (ref 0.76–1.46)
TRIGL SERPL-MCNC: 146 MG/DL
TSH SERPL DL<=0.05 MIU/L-ACNC: 0.71 UIU/ML (ref 0.36–3.74)

## 2020-10-21 PROCEDURE — 84443 ASSAY THYROID STIM HORMONE: CPT

## 2020-10-21 PROCEDURE — 3078F DIAST BP <80 MM HG: CPT | Performed by: PHYSICIAN ASSISTANT

## 2020-10-21 PROCEDURE — 82570 ASSAY OF URINE CREATININE: CPT

## 2020-10-21 PROCEDURE — 80061 LIPID PANEL: CPT

## 2020-10-21 PROCEDURE — 83036 HEMOGLOBIN GLYCOSYLATED A1C: CPT

## 2020-10-21 PROCEDURE — 80053 COMPREHEN METABOLIC PANEL: CPT

## 2020-10-21 PROCEDURE — 84439 ASSAY OF FREE THYROXINE: CPT

## 2020-10-21 PROCEDURE — 82043 UR ALBUMIN QUANTITATIVE: CPT

## 2020-10-21 PROCEDURE — 4010F ACE/ARB THERAPY RXD/TAKEN: CPT | Performed by: PHYSICIAN ASSISTANT

## 2020-10-21 PROCEDURE — 3061F NEG MICROALBUMINURIA REV: CPT | Performed by: PHYSICIAN ASSISTANT

## 2020-10-21 PROCEDURE — 82306 VITAMIN D 25 HYDROXY: CPT

## 2020-10-21 PROCEDURE — 1160F RVW MEDS BY RX/DR IN RCRD: CPT | Performed by: PHYSICIAN ASSISTANT

## 2020-10-21 PROCEDURE — 36415 COLL VENOUS BLD VENIPUNCTURE: CPT

## 2020-10-21 PROCEDURE — 99215 OFFICE O/P EST HI 40 MIN: CPT | Performed by: PHYSICIAN ASSISTANT

## 2020-10-21 PROCEDURE — 3046F HEMOGLOBIN A1C LEVEL >9.0%: CPT | Performed by: PHYSICIAN ASSISTANT

## 2020-10-21 RX ORDER — SIMVASTATIN 20 MG
20 TABLET ORAL
Qty: 90 TABLET | Refills: 1 | Status: SHIPPED | OUTPATIENT
Start: 2020-10-21 | End: 2021-04-02

## 2020-10-21 RX ORDER — LISINOPRIL 5 MG/1
5 TABLET ORAL DAILY
Qty: 90 TABLET | Refills: 1 | Status: SHIPPED | OUTPATIENT
Start: 2020-10-21 | End: 2021-04-02

## 2020-10-21 RX ORDER — INSULIN GLARGINE 100 [IU]/ML
30 INJECTION, SOLUTION SUBCUTANEOUS DAILY
Qty: 15 ML | Refills: 1 | Status: SHIPPED | OUTPATIENT
Start: 2020-10-21 | End: 2021-02-01

## 2020-10-21 RX ORDER — INSULIN ASPART 100 [IU]/ML
INJECTION, SOLUTION INTRAVENOUS; SUBCUTANEOUS
Qty: 5 PEN | Refills: 1 | Status: SHIPPED | OUTPATIENT
Start: 2020-10-21 | End: 2020-11-27

## 2020-10-26 DIAGNOSIS — E11.9 TYPE 2 DIABETES MELLITUS WITHOUT COMPLICATION, WITHOUT LONG-TERM CURRENT USE OF INSULIN (HCC): Primary | ICD-10-CM

## 2020-11-27 DIAGNOSIS — E11.65 TYPE 2 DIABETES MELLITUS WITH HYPERGLYCEMIA, WITHOUT LONG-TERM CURRENT USE OF INSULIN (HCC): ICD-10-CM

## 2020-11-27 RX ORDER — INSULIN ASPART 100 [IU]/ML
INJECTION, SOLUTION INTRAVENOUS; SUBCUTANEOUS
Qty: 15 ML | Refills: 1 | Status: SHIPPED | OUTPATIENT
Start: 2020-11-27 | End: 2021-04-26

## 2020-12-23 ENCOUNTER — TELEPHONE (OUTPATIENT)
Dept: ENDOCRINOLOGY | Facility: CLINIC | Age: 75
End: 2020-12-23

## 2021-01-14 ENCOUNTER — OFFICE VISIT (OUTPATIENT)
Dept: DIABETES SERVICES | Facility: CLINIC | Age: 76
End: 2021-01-14
Payer: COMMERCIAL

## 2021-01-14 VITALS — BODY MASS INDEX: 34.78 KG/M2 | WEIGHT: 189 LBS | HEIGHT: 62 IN

## 2021-01-14 DIAGNOSIS — E11.65 TYPE 2 DIABETES MELLITUS WITH HYPERGLYCEMIA, WITHOUT LONG-TERM CURRENT USE OF INSULIN (HCC): ICD-10-CM

## 2021-01-14 PROCEDURE — 97802 MEDICAL NUTRITION INDIV IN: CPT | Performed by: DIETITIAN, REGISTERED

## 2021-01-14 NOTE — Clinical Note
For your review  I don't know if we can refer to care coordination or social work  He doesn't seem to be able to manage his self-care well, let alone his wife's

## 2021-01-14 NOTE — PATIENT INSTRUCTIONS
1  Eat 3 meals daily, each meal needs to have 3 carbohydrate servings, equal to 45 grams of carbohydrate  2  Take 12 units of novolog before each meal  3  Make sure all of your bottled or canned drinks are "diet" or "zero calories"  4  Go to the congregate lunch in your building  5  Consider Me[employer] Wheels which will provide lunch and dinner for you 5 days a week    6   NEXT VISIT: Bring blood sugar records and insulin pens

## 2021-01-14 NOTE — PROGRESS NOTES
Medical Nutrition Therapy        Assessment    Visit Type: Initial visit    Chief complaint Pt with type 2 diabetes, seems to be a poor historian  He reports "not eating like I should" and that he can only afford to eat one meal daily  Also indicates that he is not taking Novolog daily with meals and has been using his current insulin pen for a long time (likely more than 28 days)  HPI: Jennifer diet history reveals poor intake  He may eat small amounts during the day and reports drinking sweetened (canned or bottled) iced tea  He may cook a large amount for dinner and they eat leftovers from it for days  He includes some vegetables and no fruit in his diet and there is little milk or other calcium rich food  Currently meals range from 0 to 60 grams of carbohydrate  Explained very basic pathophysiology of diabetes and impact of diet on blood glucose levels as well as importance of eating three meals daily and taking Novolog before each meal  Advised him to take 12 units Novolog (as prescribed) before eating dinner and any other significant meal  Together we discussed what foods contain CHO and very basic serving sizes  Used the "Planning healthy Meals" booklet to teach Alona Sapp more about food groups and basic carbohydrate counting  Wrote out a basic meal pattern for Alonamari Sapp with 3 meals and 1-2 snacks providing 45 g carb per meal and 15-20 g carb per snack  This plan will help promote weight maintenance  Put together sample meals for John's reference  Encouraged him to participate in the congregate lunch meal [facility] for his and his wife's benefit  He offered that he doesn't like everything served  Recommended that he eats what he can and this is better than nothing  Also discussed Meals on Wheels and explained how it works and meals provided  Offered to get him a referral for this service  He declined for now   Alona Sapp demonstrated fair understanding, Alona Sapp will call with questions or for more education  Ht Readings from Last 1 Encounters:   10/21/20 5' 2" (1 575 m)     Wt Readings from Last 2 Encounters:   10/21/20 82 9 kg (182 lb 12 8 oz)   06/19/19 92 2 kg (203 lb 3 2 oz)     Weight Change: Yes +7# sice October    Medical Diagnosis/ICD10: E11 65    Barriers to Learning: cognitive, memory and focus  Doesn't remember to take insulin or check BG regularly    Do you follow any special diet presently?: Not really, gets food stamps and food bank access with [facility] [address] 66  Who shops: patient  Who cooks: patient    Social: Lives alone with wife who is "severely handicapped" per patient in a senior apartment complex in [facility] States he has 2 adult children in the [place] area who do not visit or help      Food Log: Completed via the method of food recall    Breakfast:coffee w/ sugar substitute and 2% milk, may have a piece of toast  Morning Snack:none  Lunch:can of sweet tea  Afternoon Snack: none  Dinner: [employer] - 1 large piece, w/ gravy only, no other side orders OR currently has cubed beef, mixed veggies, potatoes, onions in the crock pot  Evening Snack:pretzels and cheese curls  Beverages: brisk tea  Eating out/Take out:states not too often  Exercise none    Calorie needs 1500 kcals/day Carbs: 45g/meal, 15g/snack         Nutrition Diagnosis:  Poor nutrition quality of life  related to Food insecurity as evidenced by  Food insecurity/unwillingness to use community services that are available    Intervention: carbohydrate counting and encouraged use of community resources     Treatment Goals: Patient will consume 3 meals a day and eliminate sugar sweetened drinks    Monitoring and evaluation:    Term code indicator  FH 1 3 2 Food Intake Criteria: Eat 3 meals daily  Term code indicator  FH 1 3 1 Fluid/Beverage Intake Criteria: Stop using sugar sweetened drinks    Patients Response to Instruction:  Luciana Campos  Expected Compliancefair    [facility]  Please feel free to call with any questions or concerns      Jovanny Nuno  [address] [place] [id number] [facility] [id number]

## 2021-01-25 ENCOUNTER — VBI (OUTPATIENT)
Dept: ADMINISTRATIVE | Facility: OTHER | Age: 76
End: 2021-01-25

## 2021-01-30 DIAGNOSIS — E11.65 TYPE 2 DIABETES MELLITUS WITH HYPERGLYCEMIA, WITHOUT LONG-TERM CURRENT USE OF INSULIN (HCC): ICD-10-CM

## 2021-02-01 RX ORDER — INSULIN GLARGINE 100 [IU]/ML
30 INJECTION, SOLUTION SUBCUTANEOUS DAILY
Qty: 15 ML | Refills: 1 | Status: SHIPPED | OUTPATIENT
Start: 2021-02-01 | End: 2021-04-26

## 2021-02-25 ENCOUNTER — OFFICE VISIT (OUTPATIENT)
Dept: DIABETES SERVICES | Facility: CLINIC | Age: 76
End: 2021-02-25
Payer: COMMERCIAL

## 2021-02-25 ENCOUNTER — TELEPHONE (OUTPATIENT)
Dept: DIABETES SERVICES | Facility: CLINIC | Age: 76
End: 2021-02-25

## 2021-02-25 VITALS — BODY MASS INDEX: 33.49 KG/M2 | HEIGHT: 62 IN | WEIGHT: 182 LBS

## 2021-02-25 DIAGNOSIS — E11.65 TYPE 2 DIABETES MELLITUS WITH HYPERGLYCEMIA, WITHOUT LONG-TERM CURRENT USE OF INSULIN (HCC): Primary | ICD-10-CM

## 2021-02-25 PROCEDURE — 97803 MED NUTRITION INDIV SUBSEQ: CPT | Performed by: DIETITIAN, REGISTERED

## 2021-02-25 NOTE — Clinical Note
MNT f/u completed  He's seeing you in 11 days  He needs care coordination, I asked Dr Monroe Singh to order  He still has no PCP

## 2021-02-25 NOTE — TELEPHONE ENCOUNTER
Please place a referral for complex care coordination for this patient  REF [id number] for nursing and [id number] for social work  He would benefit from both  He has no PCP at this time, it appears that he was dismissed from [employer]  He does not SMBG, does not take Novolog, often forgets to take Metformin  It sounds like he does take Lantus every night  He admitted today that he needs help with managing medications  Patient lives alone with wife who has reported significant health issues as well

## 2021-02-25 NOTE — PROGRESS NOTES
Medical Nutrition Therapy      Assessment    Chief complaint  "Trying to change the whole thing around" as far as food preparation at home, wants to prepare smaller quantities  Reports that his wife's aunt is helping him keep track of his finances  Still getting senior food box once a month  2 boxes of cereal, 2 qts milk, cans of fruits and vegetables and meat  Pt is not taking medications as prescribed except for Lantus  Today admitted that he needs help managing medications  Reports that his wife is outside sitting in the car and does not want to come into the building  Pt has not eaten yet today  Visit Type: Follow-up visit    HPI: Purnima Tavarez returned for follow-up today  Johns food recall is sparse  He is having difficulty managing food preparation as well as medications  He seems to have eliminated sugary drinks since last visit  He is not participating in congregate lunch offerings in his senior apartment complex  He refuses meals on wheels  Reports eating only noodles or only meat at some meals  It sounds like he has lots of unused canned goods from [employer] box he receives once a month  Overall, Jennifer meals provide 0-60 grams carbohydrate  Based on meal plan review and deficits in his daily diet provided education about importance of taking medication regularly, choosing a protein source as well as 3 carb choices at each meal, and how to use some of the canned goods he has at home  Ht Readings from Last 1 Encounters:   02/25/21 5' 2" (1 575 m)     Wt Readings from Last 2 Encounters:   02/25/21 82 6 kg (182 lb)   01/14/21 85 7 kg (189 lb)     Weight Change: Yes -7# in 6 weeks    Medical Diagnosis/reason for visit E11 65    Social: Lives alone with wife who is "severely handicapped" per patient in a senior apartment complex in [facility] States he has 2 adult children in the [place] area who do not visit or help      Food Log: Completed via the method of food recall        Breakfast:coffee, rarely eats anything  May have banana  Morning Snack:none  Lunch: may share a package of reese shi with his wife  Yesterday tunafish on bread w/ mao and onion, 2 pieces of wheat bread  Afternoon Snack: none  Dinner:reese shi yesterday  Has pork chops or hamburger or pot roast  Evening Snack:might have more ramen or other leftovers    Beverages: coffee and water, rare glass of milk  Eating out/Take out:none    Exercise no formal exercise    Calorie needs 1500 kcals/day Carbs: 45 g/meal, 15 g/snack    Nutrition Diagnosis:  Limited adherence to nutrition related recommendations  related to Food and nutrition-related knowledge deficit concerning how to make nutrition related changes as evidenced by Uncertainty as to how to consistently apply food/nutrition information    Intervention: meal planning     Treatment Goals: Patient will consume 3 meals a day and take medications as prescribed    Monitoring and evaluation:    Term code indicator  FH 1 3 2 Food Intake Criteria: Eat 3 meals daily, use meal planning tool  Term code indicator  CH 2 2 Treatments/Therapy/Alternative Medicine Criteria: Take medications as prescribed    Patients Response to Instruction:  Alvaro Fontaine  Expected Compliancefair    Thank you for coming to the [facility] for education today  Please feel free to call with any questions or concerns      Jovanny Nuno  [address] [place] [id number] [facility] [id number]

## 2021-02-25 NOTE — PATIENT INSTRUCTIONS
1  Follow the "Pick-a-Food" meal planner  2  Take metformin every day as prescribed, take at breakfast and at dinner  3  Continue to take Lantus every night  4   Take Novolog with your meals

## 2021-02-26 DIAGNOSIS — Z91.14 NONCOMPLIANCE WITH MEDICATION REGIMEN: ICD-10-CM

## 2021-02-26 DIAGNOSIS — Z79.4 TYPE 2 DIABETES MELLITUS WITH HYPERGLYCEMIA, WITH LONG-TERM CURRENT USE OF INSULIN (HCC): Primary | ICD-10-CM

## 2021-02-26 DIAGNOSIS — E11.65 TYPE 2 DIABETES MELLITUS WITH HYPERGLYCEMIA, WITH LONG-TERM CURRENT USE OF INSULIN (HCC): Primary | ICD-10-CM

## 2021-03-05 ENCOUNTER — TELEPHONE (OUTPATIENT)
Dept: ENDOCRINOLOGY | Facility: CLINIC | Age: 76
End: 2021-03-05

## 2021-03-05 NOTE — TELEPHONE ENCOUNTER
Spoke with patient and told him that he should be on    lantus at lower dose of 30 units daily at bedtime    Novolog at lower dose of 8 units before each meal

## 2021-03-05 NOTE — TELEPHONE ENCOUNTER
Patient is asking for a call to confirm his appt on 3/8/21 10:30 am with Jeremy  He wants to know what his insulin dose is      [phone]

## 2021-04-01 DIAGNOSIS — E11.9 TYPE 2 DIABETES MELLITUS WITHOUT COMPLICATION, WITHOUT LONG-TERM CURRENT USE OF INSULIN (HCC): ICD-10-CM

## 2021-04-01 DIAGNOSIS — I10 ESSENTIAL HYPERTENSION: ICD-10-CM

## 2021-04-02 PROCEDURE — 4010F ACE/ARB THERAPY RXD/TAKEN: CPT | Performed by: PHYSICIAN ASSISTANT

## 2021-04-02 RX ORDER — SIMVASTATIN 20 MG
20 TABLET ORAL
Qty: 90 TABLET | Refills: 0 | Status: SHIPPED | OUTPATIENT
Start: 2021-04-02 | End: 2021-04-28 | Stop reason: SDUPTHER

## 2021-04-02 RX ORDER — LISINOPRIL 5 MG/1
5 TABLET ORAL DAILY
Qty: 90 TABLET | Refills: 0 | Status: SHIPPED | OUTPATIENT
Start: 2021-04-02 | End: 2021-07-08

## 2021-04-24 DIAGNOSIS — E11.65 TYPE 2 DIABETES MELLITUS WITH HYPERGLYCEMIA, WITHOUT LONG-TERM CURRENT USE OF INSULIN (HCC): ICD-10-CM

## 2021-04-26 RX ORDER — INSULIN GLARGINE 100 [IU]/ML
30 INJECTION, SOLUTION SUBCUTANEOUS DAILY
Qty: 15 ML | Refills: 0 | Status: SHIPPED | OUTPATIENT
Start: 2021-04-26 | End: 2021-04-28 | Stop reason: SDUPTHER

## 2021-04-26 RX ORDER — INSULIN ASPART 100 [IU]/ML
INJECTION, SOLUTION INTRAVENOUS; SUBCUTANEOUS
Qty: 15 ML | Refills: 0 | Status: SHIPPED | OUTPATIENT
Start: 2021-04-26 | End: 2021-04-28 | Stop reason: SDUPTHER

## 2021-04-28 ENCOUNTER — OFFICE VISIT (OUTPATIENT)
Dept: ENDOCRINOLOGY | Facility: CLINIC | Age: 76
End: 2021-04-28
Payer: COMMERCIAL

## 2021-04-28 VITALS
SYSTOLIC BLOOD PRESSURE: 130 MMHG | HEART RATE: 72 BPM | WEIGHT: 184.13 LBS | HEIGHT: 62 IN | DIASTOLIC BLOOD PRESSURE: 90 MMHG | BODY MASS INDEX: 33.88 KG/M2

## 2021-04-28 DIAGNOSIS — E55.9 VITAMIN D DEFICIENCY: ICD-10-CM

## 2021-04-28 DIAGNOSIS — E11.65 TYPE 2 DIABETES MELLITUS WITH HYPERGLYCEMIA, WITH LONG-TERM CURRENT USE OF INSULIN (HCC): Primary | ICD-10-CM

## 2021-04-28 DIAGNOSIS — I10 ESSENTIAL HYPERTENSION: ICD-10-CM

## 2021-04-28 DIAGNOSIS — E11.65 TYPE 2 DIABETES MELLITUS WITH HYPERGLYCEMIA, WITHOUT LONG-TERM CURRENT USE OF INSULIN (HCC): ICD-10-CM

## 2021-04-28 DIAGNOSIS — E11.9 TYPE 2 DIABETES MELLITUS WITHOUT COMPLICATION, WITHOUT LONG-TERM CURRENT USE OF INSULIN (HCC): ICD-10-CM

## 2021-04-28 DIAGNOSIS — E78.5 DYSLIPIDEMIA: ICD-10-CM

## 2021-04-28 DIAGNOSIS — E83.52 HYPERCALCEMIA: ICD-10-CM

## 2021-04-28 DIAGNOSIS — Z79.4 TYPE 2 DIABETES MELLITUS WITH HYPERGLYCEMIA, WITH LONG-TERM CURRENT USE OF INSULIN (HCC): Primary | ICD-10-CM

## 2021-04-28 LAB — SL AMB POCT HEMOGLOBIN AIC: 11.8 (ref ?–6.5)

## 2021-04-28 PROCEDURE — 83036 HEMOGLOBIN GLYCOSYLATED A1C: CPT | Performed by: PHYSICIAN ASSISTANT

## 2021-04-28 PROCEDURE — 3046F HEMOGLOBIN A1C LEVEL >9.0%: CPT | Performed by: PHYSICIAN ASSISTANT

## 2021-04-28 PROCEDURE — 3080F DIAST BP >= 90 MM HG: CPT | Performed by: PHYSICIAN ASSISTANT

## 2021-04-28 PROCEDURE — 3075F SYST BP GE 130 - 139MM HG: CPT | Performed by: PHYSICIAN ASSISTANT

## 2021-04-28 PROCEDURE — 1160F RVW MEDS BY RX/DR IN RCRD: CPT | Performed by: PHYSICIAN ASSISTANT

## 2021-04-28 PROCEDURE — 99215 OFFICE O/P EST HI 40 MIN: CPT | Performed by: PHYSICIAN ASSISTANT

## 2021-04-28 RX ORDER — SIMVASTATIN 20 MG
20 TABLET ORAL
Qty: 90 TABLET | Refills: 0 | Status: SHIPPED | OUTPATIENT
Start: 2021-04-28 | End: 2021-07-08

## 2021-04-28 RX ORDER — ACETAMINOPHEN 160 MG
2000 TABLET,DISINTEGRATING ORAL DAILY
Qty: 30 CAPSULE | Refills: 5
Start: 2021-04-28

## 2021-04-28 RX ORDER — INSULIN GLARGINE 100 [IU]/ML
30 INJECTION, SOLUTION SUBCUTANEOUS DAILY
Qty: 15 ML | Refills: 0 | Status: SHIPPED | OUTPATIENT
Start: 2021-04-28 | End: 2021-08-24

## 2021-04-28 RX ORDER — INSULIN ASPART 100 [IU]/ML
INJECTION, SOLUTION INTRAVENOUS; SUBCUTANEOUS
Qty: 15 ML | Refills: 0 | Status: SHIPPED | OUTPATIENT
Start: 2021-04-28 | End: 2021-07-08

## 2021-04-28 NOTE — PROGRESS NOTES
Established Patient Progress Note      Chief Complaint   Patient presents with    Diabetes Type 2        History of Present Illness:   Garima Flanagan is a 68 y o  male with a history of type 2 diabetes with long term use of insulin since about 10 years ago  Reports complications of symptoms of neuropathy    Denies recent illness or hospitalizations  Denies recent severe hypoglycemic or severe hyperglycemic episodes  Denies any issues with his current regimen  home glucose monitoring: are performed sporadically and blood sugars have been in the range of 200-240  Doesn't like to check because he gets frustrated with results  Has not been taking novolog because not often eating because his wife does not feel like cooking but he does eat TV Dinners/chicken pot pie  Has cereal, but does not yet have time to eat it today  Was drinking a iced tea that was high in sugar but stopped  He does report increased thirst/frequent urination  He denies any problems with memory  Reasons meals/meds are skipped are due to his wife's medical conditions and being on the go for appointments  Hasn't completed labs ordered at last visit, did not bring glucose log to visit, and has missed multiple appts since last visit  He did meet with dietician 2/25/2021 and has indicated he can use assistance with medication management and referral to care coordination was placed but no services have been provided at this time  He has not yet established with a new PCP  Current regimen:   Lantus 30 units daily at bedtime at 10PM  Novolog--- not taking    Needs RX  Metformin-- Not taking, has rX    Last Eye Exam: Needs exam  Last Foot Exam: UTD    Has hypertension: Not taking lisinopril, but has rx at home  Has hyperlipidemia: Not taking simvastatin, but has rx at home    Hx Vitamin D Deficiency- not taking supplements      Patient Active Problem List   Diagnosis    DMII (diabetes mellitus, type 2) ([facility] [address] )    BPH (benign prostatic hyperplasia)    Dyslipidemia    Essential hypertension    Psoriasis    Class 1 obesity due to excess calories with serious comorbidity and body mass index (BMI) of 32 0 to 32 9 in adult    Type 2 diabetes mellitus with hyperglycemia, with long-term current use of insulin (HCC)    Vitamin D deficiency    Hypercalcemia      Past Medical History:   Diagnosis Date    Diabetes mellitus ([address] )       Past Surgical History:   Procedure Laterality Date    COLONOSCOPY W/ POLYPECTOMY      LEG SURGERY Right     TONSILLECTOMY        Family History   Problem Relation Age of Onset    Heart disease Mother     Cancer Sister         bladder     Diabetes Brother     Leukemia Brother      Social History     Tobacco Use    Smoking status: Former Smoker     Types: Cigarettes     Quit date:      Years since quittin 3    Smokeless tobacco: Never Used   Substance Use Topics    Alcohol use: No     Comment: very little     No Known Allergies      Current Outpatient Medications:     Accu-Chek Noemy Plus test strip, TEST 3 TIMES A DAY, Disp: 250 each, Rfl: 0    Accu-Chek Softclix Lancets lancets, CHECK 3 TIMES A DAY, Disp: 100 each, Rfl: 3    BD Pen Needle Apple U/F 32G X 4 MM MISC, USE FOUR TIMES A DAY, Disp: 100 each, Rfl: 3    insulin glargine (Lantus SoloStar) 100 units/mL injection pen, Inject 30 Units under the skin daily, Disp: 15 mL, Rfl: 0    Cholecalciferol (Vitamin D3) 50 MCG (2000 UT) capsule, Take 1 capsule (2,000 Units total) by mouth daily, Disp: 30 capsule, Rfl: 5    insulin aspart (NovoLOG FlexPen) 100 UNIT/ML injection pen, 5 units with each meal , Disp: 15 mL, Rfl: 0    lisinopril (ZESTRIL) 5 mg tablet, TAKE 1 TABLET (5 MG TOTAL) BY MOUTH DAILY (Patient not taking: Reported on 2021), Disp: 90 tablet, Rfl: 0    metFORMIN (GLUCOPHAGE) 500 mg tablet, Take 1 tablet (500 mg total) by mouth 2 (two) times a day with meals (Patient not taking: Reported on 2021), Disp: 60 tablet, Rfl: 1   simvastatin (ZOCOR) 20 mg tablet, Take 1 tablet (20 mg total) by mouth daily at bedtime, Disp: 90 tablet, Rfl: 0    Review of Systems   Constitutional: Negative for activity change, appetite change and fatigue  HENT: Negative for sore throat, trouble swallowing and voice change  Eyes: Negative for visual disturbance  Respiratory: Negative for choking, chest tightness and shortness of breath  Cardiovascular: Negative for chest pain, palpitations and leg swelling  Gastrointestinal: Negative for abdominal pain, constipation and diarrhea  Endocrine: Positive for polydipsia and polyuria  Negative for cold intolerance, heat intolerance and polyphagia  Genitourinary: Negative for frequency  Musculoskeletal: Negative for arthralgias and myalgias  Skin: Negative for rash  Neurological: Negative for dizziness and syncope  Hematological: Negative for adenopathy  Psychiatric/Behavioral: Negative for sleep disturbance  All other systems reviewed and are negative  Physical Exam:  Body mass index is 33 68 kg/m²  /90 (BP Location: Left arm, Patient Position: Sitting, Cuff Size: Standard)   Pulse 72   Ht 5' 2" (1 575 m)   Wt 83 5 kg (184 lb 2 oz)   BMI 33 68 kg/m²    Wt Readings from Last 3 Encounters:   04/28/21 83 5 kg (184 lb 2 oz)   02/25/21 82 6 kg (182 lb)   01/14/21 85 7 kg (189 lb)       Physical Exam  Vitals signs reviewed  Constitutional:       General: He is not in acute distress  Appearance: He is well-developed  HENT:      Head: Normocephalic and atraumatic  Eyes:      Conjunctiva/sclera: Conjunctivae normal       Pupils: Pupils are equal, round, and reactive to light  Neck:      Musculoskeletal: Normal range of motion and neck supple  Thyroid: No thyromegaly  Cardiovascular:      Rate and Rhythm: Normal rate and regular rhythm  Heart sounds: Normal heart sounds  No murmur  Pulmonary:      Effort: Pulmonary effort is normal  No respiratory distress  Breath sounds: Normal breath sounds  No wheezing or rales  Abdominal:      General: Bowel sounds are normal  There is no distension  Palpations: Abdomen is soft  Tenderness: There is no abdominal tenderness  Musculoskeletal: Normal range of motion  Lymphadenopathy:      Cervical: No cervical adenopathy  Skin:     General: Skin is warm and dry  Neurological:      Mental Status: He is alert and oriented to person, place, and time           Labs:   Lab Results   Component Value Date    HGBA1C 11 8 (A) 04/28/2021    HGBA1C 11 7 (H) 10/21/2020    HGBA1C 10 8 (H) 05/07/2019     Lab Results   Component Value Date    CREATININE 0 96 10/21/2020    CREATININE 0 96 05/07/2019    CREATININE 0 94 09/06/2018    BUN 12 10/21/2020     11/16/2015    K 4 2 10/21/2020     10/21/2020    CO2 27 10/21/2020     eGFR   Date Value Ref Range Status   10/21/2020 77 ml/min/1 73sq m Final     Lab Results   Component Value Date    CHOL 157 03/24/2015    HDL 57 10/21/2020    TRIG 146 10/21/2020     Lab Results   Component Value Date    ALT 34 10/21/2020    AST 14 10/21/2020    ALKPHOS 102 10/21/2020    BILITOT 0 45 11/16/2015     Lab Results   Component Value Date    WRD5HGGIKXXL 0 708 10/21/2020    QHH5NYEPSRAO 0 847 05/07/2019    TXI0PLGYMRPR 0 775 11/16/2015     Lab Results   Component Value Date    FREET4 1 23 10/21/2020       Impression & Plan:    Problem List Items Addressed This Visit        Endocrine    DMII (diabetes mellitus, type 2) (HCC)    Relevant Medications    insulin aspart (NovoLOG FlexPen) 100 UNIT/ML injection pen    insulin glargine (Lantus SoloStar) 100 units/mL injection pen    simvastatin (ZOCOR) 20 mg tablet    Other Relevant Orders    POCT hemoglobin A1c (Completed)    Ambulatory referral to Ophthalmology    Ambulatory referral to Diabetic Education    Ambulatory referral to social work care management program    Hemoglobin A1C    Comprehensive metabolic panel    Lipid Panel with Direct LDL reflex    TSH, 3rd generation    T4, free    PTH, intact    Type 2 diabetes mellitus with hyperglycemia, with long-term current use of insulin ([facility] [address] ) - Primary     Diabetes remains very poorly controlled  He has been non-compliant with Novolog and metformin, reports compliance with Lantus  He is very inconsistent with his meals  Advised him to start taking Novolog 5 units daily with all meals  He was given written instructions  Refer for Diabetes education class assessment- he will require individual education due to lack of computer and inability to complete virtual classes  Refer to [facility] practice-- Appt made with Dr Liu Banks 5/27 at 9:30 AM  He was given contact info for office  Will need to set up care coordination for this patient-- will contact diabetes educator for assistance  Lab Results   Component Value Date    HGBA1C 11 8 (A) 04/28/2021            Relevant Medications    insulin aspart (NovoLOG FlexPen) 100 UNIT/ML injection pen    insulin glargine (Lantus SoloStar) 100 units/mL injection pen    Other Relevant Orders    POCT hemoglobin A1c (Completed)    Ambulatory referral to Ophthalmology    Ambulatory referral to Diabetic Education    Ambulatory referral to social work care management program    Hemoglobin A1C    Comprehensive metabolic panel    Lipid Panel with Direct LDL reflex    TSH, 3rd generation    T4, free    PTH, intact       Cardiovascular and Mediastinum    Essential hypertension     Not at goal, resume lisinopril  Other    Dyslipidemia     Resume Simvastatin, check CMP/Lipid Panel  Vitamin D deficiency     Take Supplements, 2,000 units daily  Relevant Medications    Cholecalciferol (Vitamin D3) 50 MCG (2000 UT) capsule    Other Relevant Orders    PTH, intact    Hypercalcemia     Complete lab testing as ordered            Relevant Orders    Comprehensive metabolic panel    PTH, intact          Orders Placed This Encounter Procedures    Hemoglobin A1C     Standing Status:   Future     Standing Expiration Date:   4/28/2022    Comprehensive metabolic panel     This is a patient instruction: Patient fasting for 8 hours or longer recommended  Standing Status:   Future     Standing Expiration Date:   4/28/2022    Lipid Panel with Direct LDL reflex     This is a patient instruction: This test requires patient fasting for 10-12 hours or longer  Drinking of black coffee or black tea is acceptable  Standing Status:   Future     Standing Expiration Date:   4/28/2022    TSH, 3rd generation     This is a patient instruction: This test is non-fasting  Please drink two glasses of water morning of bloodwork          Standing Status:   Future     Standing Expiration Date:   4/28/2022    T4, free     Standing Status:   Future     Standing Expiration Date:   4/28/2022    PTH, intact     Standing Status:   Future     Standing Expiration Date:   4/28/2022    Ambulatory referral to Ophthalmology     Standing Status:   Future     Standing Expiration Date:   4/28/2022     Referral Priority:   Routine     Referral Type:   Consult - AMB     Referral Reason:   Specialty Services Required     Referred to Provider:   King Ashley MD     Requested Specialty:   Ophthalmology     Number of Visits Requested:   1     Expiration Date:   4/28/2022    Ambulatory referral to Diabetic Education     Standing Status:   Future     Standing Expiration Date:   4/28/2022     Referral Priority:   Routine     Referral Type:   Consult - AMB     Referral Reason:   Specialty Services Required     Requested Specialty:   Diabetes Services     Number of Visits Requested:   1     Expiration Date:   4/28/2022    Ambulatory referral to social work care management program     Standing Status:   Future     Standing Expiration Date:   4/28/2022     Referral Priority:   Routine     Referral Type:   Consult - AMB     Referral Reason:   Specialty Services Required Number of Visits Requested:   1     Expiration Date:   4/28/2022    POCT hemoglobin A1c       There are no Patient Instructions on file for this visit  Discussed with the patient and all questioned fully answered  He will call me if any problems arise  Follow-up appointment in 2 months       Counseled patient on diagnostic results, prognosis, risk and benefit of treatment options, instruction for management, importance of treatment compliance, Risk  factor reduction and impressions    Love Reddy PA-C

## 2021-04-28 NOTE — ASSESSMENT & PLAN NOTE
Diabetes remains very poorly controlled  He has been non-compliant with Novolog and metformin, reports compliance with Lantus  He is very inconsistent with his meals  Advised him to start taking Novolog 5 units daily with all meals  He was given written instructions  Refer for Diabetes education class assessment- he will require individual education due to lack of computer and inability to complete virtual classes  Refer to [facility] practice-- Appt made with Dr Dina Cloud 5/27 at 9:30 AM  He was given contact info for office  Will need to set up care coordination for this patient-- will contact diabetes educator for assistance     Lab Results   Component Value Date    HGBA1C 11 8 (A) 04/28/2021

## 2021-05-06 ENCOUNTER — PATIENT OUTREACH (OUTPATIENT)
Dept: CASE MANAGEMENT | Facility: HOSPITAL | Age: 76
End: 2021-05-06

## 2021-05-06 NOTE — PROGRESS NOTES
Spoke with mariam  Explained my role  He is open to future outreaches  Not eating three meals a day usually out seeing people and doing stuff  Wife and he take turns cooking  Not checking blood sugars daily  Taking his insulin as ordered  Had Meals on wheels but does not like food  To start DM classes but has not heard anything yet  No teeth and dentures do not fit  I will mail him information on DM diet and eye doctor  Denies issus with affording medications

## 2021-05-06 NOTE — PROGRESS NOTES
Alexus Andre answered stated he was driving and could not talk   He said I can call back around 3pm

## 2021-05-19 ENCOUNTER — PATIENT OUTREACH (OUTPATIENT)
Dept: CASE MANAGEMENT | Facility: HOSPITAL | Age: 76
End: 2021-05-19

## 2021-05-26 ENCOUNTER — PATIENT OUTREACH (OUTPATIENT)
Dept: CASE MANAGEMENT | Facility: HOSPITAL | Age: 76
End: 2021-05-26

## 2021-05-27 ENCOUNTER — PATIENT OUTREACH (OUTPATIENT)
Dept: CASE MANAGEMENT | Facility: HOSPITAL | Age: 76
End: 2021-05-27

## 2021-06-03 ENCOUNTER — PATIENT OUTREACH (OUTPATIENT)
Dept: CASE MANAGEMENT | Facility: HOSPITAL | Age: 76
End: 2021-06-03

## 2021-06-03 NOTE — PROGRESS NOTES
Spoke with Pedro Yanez  They can either do a pill organizer for patient or a sleeve  They said he can stop in and they will go over his options , they said he can bring his current pill bottles

## 2021-06-03 NOTE — PROGRESS NOTES
Spoke with John Wilkins  He reports not taking his blood sugars because he does not like the numbers he gets  Not eating three meals a day due to no appetite  He did not receive the diabetic information I sent him  He is only taking Lantus insulin not taking any of his oral medications  We talked about him trying to do FBS and taking his oral medication  He only has three to take daily  I will look into bubble packing medications to make things simpler  Reminded him of endocrinology appointment on 6/9 and PCP 6/18

## 2021-06-03 NOTE — PROGRESS NOTES
Left message for Case Hurt  That he can stop at pharmacy with his medications and they will talk to him about two options for pill organization

## 2021-06-09 ENCOUNTER — TELEPHONE (OUTPATIENT)
Dept: OTHER | Facility: OTHER | Age: 76
End: 2021-06-09

## 2021-06-09 NOTE — TELEPHONE ENCOUNTER
Patient calling to inform you that he started eating his breakfast this morning and he thought he was told to fast before he came in for this appointment today? His appointment is for [phone] today  He initially wanted to cancel his appointment because he started eating but I told him that someone will call him back to confirm that he needed to fast and if so can he still keep this appointment? Thank you

## 2021-06-10 ENCOUNTER — PATIENT OUTREACH (OUTPATIENT)
Dept: CASE MANAGEMENT | Facility: HOSPITAL | Age: 76
End: 2021-06-10

## 2021-06-10 NOTE — PROGRESS NOTES
Spoke with patients wife she reports V[facility] Light is not home right now , she took my contact information for Vida Light to return my call

## 2021-06-16 ENCOUNTER — PATIENT OUTREACH (OUTPATIENT)
Dept: CASE MANAGEMENT | Facility: HOSPITAL | Age: 76
End: 2021-06-16

## 2021-06-16 NOTE — PROGRESS NOTES
Spoke with Charmaine Bocanegra he has not contacted pharmacy yet to help with pill organizing his medications  Reminded him of PCP appointment on 6/18/21  He did not receive diabetic information yet  I will let information for him when he comes to appointment  Not taking his blood sugar  Only taking night time insulin  I will check back in two weeks  Admits to eating more junk food than he should

## 2021-06-18 ENCOUNTER — OFFICE VISIT (OUTPATIENT)
Dept: FAMILY MEDICINE CLINIC | Facility: CLINIC | Age: 76
End: 2021-06-18
Payer: COMMERCIAL

## 2021-06-18 VITALS
WEIGHT: 183.4 LBS | BODY MASS INDEX: 33.75 KG/M2 | HEIGHT: 62 IN | TEMPERATURE: 97 F | HEART RATE: 64 BPM | RESPIRATION RATE: 16 BRPM | OXYGEN SATURATION: 97 % | DIASTOLIC BLOOD PRESSURE: 92 MMHG | SYSTOLIC BLOOD PRESSURE: 150 MMHG

## 2021-06-18 DIAGNOSIS — E66.9 OBESITY (BMI 30-39.9): ICD-10-CM

## 2021-06-18 DIAGNOSIS — R05.9 COUGH: Primary | ICD-10-CM

## 2021-06-18 DIAGNOSIS — H93.13 TINNITUS OF BOTH EARS: ICD-10-CM

## 2021-06-18 DIAGNOSIS — I10 ESSENTIAL HYPERTENSION: ICD-10-CM

## 2021-06-18 DIAGNOSIS — H04.129 DRY EYE: ICD-10-CM

## 2021-06-18 DIAGNOSIS — R68.2 DRY MOUTH: ICD-10-CM

## 2021-06-18 DIAGNOSIS — E11.65 TYPE 2 DIABETES MELLITUS WITH HYPERGLYCEMIA, WITH LONG-TERM CURRENT USE OF INSULIN (HCC): ICD-10-CM

## 2021-06-18 DIAGNOSIS — Z79.4 TYPE 2 DIABETES MELLITUS WITH HYPERGLYCEMIA, WITH LONG-TERM CURRENT USE OF INSULIN (HCC): ICD-10-CM

## 2021-06-18 DIAGNOSIS — E78.5 DYSLIPIDEMIA: ICD-10-CM

## 2021-06-18 LAB — SL AMB POCT GLUCOSE BLD: 309

## 2021-06-18 PROCEDURE — 3080F DIAST BP >= 90 MM HG: CPT | Performed by: FAMILY MEDICINE

## 2021-06-18 PROCEDURE — 3725F SCREEN DEPRESSION PERFORMED: CPT | Performed by: FAMILY MEDICINE

## 2021-06-18 PROCEDURE — 1036F TOBACCO NON-USER: CPT | Performed by: FAMILY MEDICINE

## 2021-06-18 PROCEDURE — 3288F FALL RISK ASSESSMENT DOCD: CPT | Performed by: FAMILY MEDICINE

## 2021-06-18 PROCEDURE — 82948 REAGENT STRIP/BLOOD GLUCOSE: CPT | Performed by: FAMILY MEDICINE

## 2021-06-18 PROCEDURE — 99203 OFFICE O/P NEW LOW 30 MIN: CPT | Performed by: FAMILY MEDICINE

## 2021-06-18 PROCEDURE — 3077F SYST BP >= 140 MM HG: CPT | Performed by: FAMILY MEDICINE

## 2021-06-18 PROCEDURE — 1160F RVW MEDS BY RX/DR IN RCRD: CPT | Performed by: FAMILY MEDICINE

## 2021-06-18 PROCEDURE — 1101F PT FALLS ASSESS-DOCD LE1/YR: CPT | Performed by: FAMILY MEDICINE

## 2021-06-18 NOTE — PROGRESS NOTES
Chief Complaint   Patient presents with   Emilia Pimentel Establish Care     New patient  Complains of bringing up heavy mucous in throat and trouble falling asleep   Dry Eye     Bilateral eyes since this morning  Health Maintenance   Topic Date Due    Pneumococcal Vaccine: 65+ Years (1 of 2 - PPSV23) Never done    BMI: Followup Plan  Never done    DTaP,Tdap,and Td Vaccines (1 - Tdap) Never done    DM Eye Exam  11/08/2018    Medicare Annual Wellness Visit (AWV)  05/20/2020    HEMOGLOBIN A1C  07/28/2021    Influenza Vaccine (Season Ended) 09/01/2021    Diabetic Foot Exam  10/21/2021    Fall Risk  06/18/2022    Depression Screening PHQ  06/18/2022    BMI: Adult  06/18/2022    Colorectal Cancer Screening  06/19/2022    Hepatitis C Screening  Completed    COVID-19 Vaccine  Completed    HIB Vaccine  Aged Out    Hepatitis B Vaccine  Aged Out    IPV Vaccine  Aged Out    Hepatitis A Vaccine  Aged Out    Meningococcal ACWY Vaccine  Aged Out    HPV Vaccine  Aged Out       BMI Counseling: Body mass index is 33 28 kg/m²  The BMI is above normal  Nutrition recommendations include decreasing portion sizes, encouraging healthy choices of fruits and vegetables, decreasing fast food intake, consuming healthier snacks and limiting drinks that contain sugar  Exercise recommendations include moderate physical activity 150 minutes/week  No pharmacotherapy was ordered  Assessment/Plan:    Type 2 diabetes mellitus with hyperglycemia, with long-term current use of insulin ([facility])    Lab Results   Component Value Date    HGBA1C 11 8 (A) 04/28/2021     Today blood sugar 309 in office  Advised pt to take metformin and use insulin as ordered  Follow up with endocrinology  Essential hypertension  BP elevated in office today  Pt denies symptoms like headache, vision change, SOB or chest pain  DASH diet  Advised pt to take lisinopril 5mg QD as ordered   Check BP at home 2/day and call office if BP always >140/90  Dyslipidemia  Low fat diet  Advised pt to take simvastatin 20mg qhs as ordered  Cough  Will check CXR  Refused flu shot  Got Covid19 vaccine  Refused PCV13 or pneumovax  RTO in 3 months  Diagnoses and all orders for this visit:    Cough  -     XR chest pa & lateral; Future    Dry eye  -     Sjogren's Antibodies; Future  -     Ambulatory referral to Ophthalmology; Future    Dry mouth  -     Sjogren's Antibodies; Future    Type 2 diabetes mellitus with hyperglycemia, with long-term current use of insulin ([facility] [address] )  -     Ambulatory referral to Ophthalmology; Future  -     POCT blood glucose    Essential hypertension    Dyslipidemia    Obesity (BMI 30-39  9)    Other orders  -     Cancel: PNEUMOCOCCAL POLYSACCHARIDE VACCINE 23-VALENT =>1YO SQ IM  -     Cancel: Hemoglobin A1C ([facility]); Future  -     Cancel: POCT hemoglobin A1c          Subjective:      Patient ID: Heaven Hdez is a 68 y o  male  HPI    Pt is here by himself to establish care  Saw PCP before  Missed some appts and was discharged per pt  Cough with mucous all the time  Pt states he had cough/mucous when he was young  Denies fever, wheezing or SOB  Quit smoking 30 years ago  Smoked 1ppd for 20 years  Hard to fall asleep for 2 years  After he sleep, he can sleep for 8 hours  Did not try OTC medication  Does not feel tired during the day  Advised pt to use OTC melatonin  Dry eye for a while  Sometimes crusty in the morning  Sometimes eyes pain  Denies itchy  Feels dry mouth also  DM---4/2021 hgA1C 11 8 uncontrolled  FU endocrinology  He uses lantus 30u qhs  He suppose to use novolog 5u with meals but he did not use it  He suppose to use metformin 500mg bid but he did not use it  Denies hypoglycemia episode  Denies neuropathy  Did not see ophthalmology for a while  FU podiatry per pt  HTN---He suppose to take lisinopril 5mgQD but he always forgot to take it  Denies headache or chest pain  Always has ears ringing  Hyperlipidemia----He suppose to take simvastin 20mg qhs but he did not take it  Vit D deficiency---He suppose to take vit D but he did not use it  No alcohol  No drugs  Lives with wife  Does all ADL's  Still drive  Denies recent falls  Denies depression  The following portions of the patient's history were reviewed and updated as appropriate: allergies, current medications, past family history, past medical history, past social history, past surgical history and problem list     Review of Systems   Constitutional: Negative for appetite change, chills and fever  HENT: Negative for congestion, ear pain, sinus pain and sore throat  Eyes: Positive for discharge  Negative for itching and visual disturbance  Respiratory: Positive for cough  Negative for apnea, chest tightness, shortness of breath and wheezing  Cardiovascular: Negative for chest pain, palpitations and leg swelling  Gastrointestinal: Negative for abdominal pain, anal bleeding, constipation, diarrhea, nausea and vomiting  Endocrine: Negative for cold intolerance, heat intolerance and polyuria  Genitourinary: Negative for difficulty urinating and dysuria  Musculoskeletal: Negative for arthralgias, back pain and myalgias  Skin: Negative for rash  Neurological: Negative for dizziness and headaches  Psychiatric/Behavioral: Negative for agitation  Objective:      /92 (BP Location: Left arm, Patient Position: Sitting, Cuff Size: Adult)   Pulse 64   Temp (!) 97 °F (36 1 °C) (Temporal)   Resp 16   Ht 5' 2 25" (1 581 m)   Wt 83 2 kg (183 lb 6 4 oz)   SpO2 97%   BMI 33 28 kg/m²          Physical Exam  Constitutional:       Appearance: He is well-developed  HENT:      Head: Normocephalic and atraumatic  Eyes:      General:         Right eye: No discharge  Left eye: No discharge        Conjunctiva/sclera: Conjunctivae normal  Cardiovascular:      Rate and Rhythm: Normal rate and regular rhythm  Heart sounds: Normal heart sounds  No murmur heard  No friction rub  No gallop  Pulmonary:      Effort: Pulmonary effort is normal  No respiratory distress  Breath sounds: Normal breath sounds  No wheezing or rales  Abdominal:      General: Bowel sounds are normal  There is no distension  Palpations: Abdomen is soft  Tenderness: There is no abdominal tenderness  There is no guarding  Musculoskeletal:         General: Normal range of motion  Cervical back: Normal range of motion and neck supple  No tenderness  Right lower leg: No edema  Left lower leg: No edema  Lymphadenopathy:      Cervical: No cervical adenopathy  Neurological:      Mental Status: He is alert

## 2021-06-18 NOTE — ASSESSMENT & PLAN NOTE
BP elevated in office today  Pt denies symptoms like headache, vision change, SOB or chest pain  DASH diet  Advised pt to take lisinopril 5mg QD as ordered  Check BP at home 2/day and call office if BP always >140/90

## 2021-06-18 NOTE — ASSESSMENT & PLAN NOTE
Lab Results   Component Value Date    HGBA1C 11 8 (A) 04/28/2021     Today blood sugar 309 in office  Advised pt to take metformin and use insulin as ordered  Follow up with endocrinology

## 2021-06-30 ENCOUNTER — PATIENT OUTREACH (OUTPATIENT)
Dept: FAMILY MEDICINE CLINIC | Facility: CLINIC | Age: 76
End: 2021-06-30

## 2021-06-30 NOTE — PROGRESS NOTES
Attempted outreach no answer voicemail box is full  Chart review- Patient did establish with new PCP

## 2021-07-07 ENCOUNTER — PATIENT OUTREACH (OUTPATIENT)
Dept: FAMILY MEDICINE CLINIC | Facility: CLINIC | Age: 76
End: 2021-07-07

## 2021-07-07 NOTE — PROGRESS NOTES
Spoke with Abdiel Her  His blood pressure machine got corroded from the batteries  He has to throw it out  encouraged him to check blood pressure at the pharmacy  He thinks he takes his lisinopril and metformin but not every day  Uses daily insulin not meal time insulin  Encouraged him again to go to pharmacy and let them help him organize his medications  Took his blood sugar a few weeks ago before eating it was 213  I will check back again  Encouraged him to go for blood work ordered at his pcp appointment

## 2021-07-08 DIAGNOSIS — E11.9 TYPE 2 DIABETES MELLITUS WITHOUT COMPLICATION, WITHOUT LONG-TERM CURRENT USE OF INSULIN (HCC): ICD-10-CM

## 2021-07-08 DIAGNOSIS — I10 ESSENTIAL HYPERTENSION: ICD-10-CM

## 2021-07-08 DIAGNOSIS — E11.65 TYPE 2 DIABETES MELLITUS WITH HYPERGLYCEMIA, WITHOUT LONG-TERM CURRENT USE OF INSULIN (HCC): ICD-10-CM

## 2021-07-08 PROCEDURE — 4010F ACE/ARB THERAPY RXD/TAKEN: CPT | Performed by: FAMILY MEDICINE

## 2021-07-08 RX ORDER — LISINOPRIL 5 MG/1
5 TABLET ORAL DAILY
Qty: 90 TABLET | Refills: 0 | Status: SHIPPED | OUTPATIENT
Start: 2021-07-08 | End: 2021-10-04

## 2021-07-08 RX ORDER — INSULIN ASPART 100 [IU]/ML
INJECTION, SOLUTION INTRAVENOUS; SUBCUTANEOUS
Qty: 15 ML | Refills: 0 | Status: SHIPPED | OUTPATIENT
Start: 2021-07-08 | End: 2021-08-26 | Stop reason: SDUPTHER

## 2021-07-08 RX ORDER — SIMVASTATIN 20 MG
20 TABLET ORAL
Qty: 90 TABLET | Refills: 0 | Status: SHIPPED | OUTPATIENT
Start: 2021-07-08 | End: 2021-10-04

## 2021-07-21 ENCOUNTER — PATIENT OUTREACH (OUTPATIENT)
Dept: FAMILY MEDICINE CLINIC | Facility: CLINIC | Age: 76
End: 2021-07-21

## 2021-07-23 ENCOUNTER — PATIENT OUTREACH (OUTPATIENT)
Dept: FAMILY MEDICINE CLINIC | Facility: CLINIC | Age: 76
End: 2021-07-23

## 2021-07-28 ENCOUNTER — PATIENT OUTREACH (OUTPATIENT)
Dept: FAMILY MEDICINE CLINIC | Facility: CLINIC | Age: 76
End: 2021-07-28

## 2021-07-28 NOTE — PROGRESS NOTES
Left message with my contact information for patient to return my call  Unable to reach letter sent also

## 2021-07-28 NOTE — LETTER
Date: 07/28/21    Dear Casie Nguyen,   My name is Austin Kapadia; I am a registered nurse care manager working with [facility] [address]  [address] [address]  I have not been able to reach you and would like to set a time that I can talk with you over the phone  I have spoken to you before Paul Strong and I just wanted to talk again with you to see how you are doing with your medications and health issues     Sincerely,  Austin Kapadia  [phone]  Outpatient Care Manager

## 2021-08-04 ENCOUNTER — PATIENT OUTREACH (OUTPATIENT)
Dept: FAMILY MEDICINE CLINIC | Facility: CLINIC | Age: 76
End: 2021-08-04

## 2021-08-04 NOTE — PROGRESS NOTES
Spoke with Jackie Back  He took his metformin, lisinopril and simvastatin today because he remembered  He does not take Novolog insulin at all only takes Lantus 30units at 10pm daily  Not consistent with oral medications  Encouraged him to take medications daily including Novolog  Asked him to talk with pharmacy about pill pack options  He does not check his blood sugar because he does not like to see high readings  He states he is trying to make smart choices  He never received DM educational material I will resend it  Reminded him of endocrinology appointment on 8/26/21 and to get blood work done a week before  Jackie Back states he forgets about blood work and drinks coffee and eats  Instructed him to make a reminder note for himself   I will print calendar of his appointments and mail it to him

## 2021-08-25 ENCOUNTER — PATIENT OUTREACH (OUTPATIENT)
Dept: FAMILY MEDICINE CLINIC | Facility: CLINIC | Age: 76
End: 2021-08-25

## 2021-08-25 NOTE — PROGRESS NOTES
Spoke with pharmacist to see if patient is getting medications packaged pharmacy said he just walked in and he will talk with Milo Cummings about medication compliance

## 2021-08-25 NOTE — PROGRESS NOTES
Spoke with Jason Pugh  Reminder call received for endocrinology appointment tomorrow  He did not go for blood work  Reviewed how he could fast after midnight and go for blood work in the morning  Reminded him to arrive for appointment at 200 tomorrow he said he  Knows where he is going  He will go to pharmacy later to  insulin  I reminded him to ask about pill packing  He is not taking oral medications daily  Not checking his blood sugar because he does not like to see readings over 200  Instructed him that if he took insulin and oral mediation his numbers might be under 200  I will check back after endocrinology appointment

## 2021-08-26 ENCOUNTER — OFFICE VISIT (OUTPATIENT)
Dept: ENDOCRINOLOGY | Facility: CLINIC | Age: 76
End: 2021-08-26
Payer: COMMERCIAL

## 2021-08-26 ENCOUNTER — APPOINTMENT (OUTPATIENT)
Dept: LAB | Facility: HOSPITAL | Age: 76
End: 2021-08-26
Payer: COMMERCIAL

## 2021-08-26 VITALS
HEART RATE: 70 BPM | SYSTOLIC BLOOD PRESSURE: 122 MMHG | DIASTOLIC BLOOD PRESSURE: 78 MMHG | BODY MASS INDEX: 33.57 KG/M2 | HEIGHT: 62 IN | WEIGHT: 182.4 LBS

## 2021-08-26 DIAGNOSIS — E55.9 VITAMIN D DEFICIENCY: ICD-10-CM

## 2021-08-26 DIAGNOSIS — Z79.4 TYPE 2 DIABETES MELLITUS WITH HYPERGLYCEMIA, WITH LONG-TERM CURRENT USE OF INSULIN (HCC): Primary | ICD-10-CM

## 2021-08-26 DIAGNOSIS — H04.129 DRY EYE: ICD-10-CM

## 2021-08-26 DIAGNOSIS — E11.65 TYPE 2 DIABETES MELLITUS WITH HYPERGLYCEMIA, WITH LONG-TERM CURRENT USE OF INSULIN (HCC): ICD-10-CM

## 2021-08-26 DIAGNOSIS — E83.52 HYPERCALCEMIA: ICD-10-CM

## 2021-08-26 DIAGNOSIS — E78.5 DYSLIPIDEMIA: ICD-10-CM

## 2021-08-26 DIAGNOSIS — E11.65 TYPE 2 DIABETES MELLITUS WITH HYPERGLYCEMIA, WITH LONG-TERM CURRENT USE OF INSULIN (HCC): Primary | ICD-10-CM

## 2021-08-26 DIAGNOSIS — E11.65 TYPE 2 DIABETES MELLITUS WITH HYPERGLYCEMIA, WITHOUT LONG-TERM CURRENT USE OF INSULIN (HCC): ICD-10-CM

## 2021-08-26 DIAGNOSIS — I10 ESSENTIAL HYPERTENSION: ICD-10-CM

## 2021-08-26 DIAGNOSIS — Z79.4 TYPE 2 DIABETES MELLITUS WITH HYPERGLYCEMIA, WITH LONG-TERM CURRENT USE OF INSULIN (HCC): ICD-10-CM

## 2021-08-26 DIAGNOSIS — E21.3 HYPERPARATHYROIDISM (HCC): ICD-10-CM

## 2021-08-26 DIAGNOSIS — E11.9 TYPE 2 DIABETES MELLITUS WITHOUT COMPLICATION, WITHOUT LONG-TERM CURRENT USE OF INSULIN (HCC): ICD-10-CM

## 2021-08-26 DIAGNOSIS — R68.2 DRY MOUTH: ICD-10-CM

## 2021-08-26 LAB
ALBUMIN SERPL BCP-MCNC: 3.1 G/DL (ref 3.5–5)
ALP SERPL-CCNC: 87 U/L (ref 46–116)
ALT SERPL W P-5'-P-CCNC: 28 U/L (ref 12–78)
ANION GAP SERPL CALCULATED.3IONS-SCNC: 1 MMOL/L (ref 4–13)
AST SERPL W P-5'-P-CCNC: 18 U/L (ref 5–45)
BILIRUB SERPL-MCNC: 0.52 MG/DL (ref 0.2–1)
BUN SERPL-MCNC: 9 MG/DL (ref 5–25)
CALCIUM ALBUM COR SERPL-MCNC: 10.9 MG/DL (ref 8.3–10.1)
CALCIUM SERPL-MCNC: 10.2 MG/DL (ref 8.3–10.1)
CHLORIDE SERPL-SCNC: 105 MMOL/L (ref 100–108)
CHOLEST SERPL-MCNC: 190 MG/DL (ref 50–200)
CO2 SERPL-SCNC: 29 MMOL/L (ref 21–32)
CREAT SERPL-MCNC: 0.79 MG/DL (ref 0.6–1.3)
EST. AVERAGE GLUCOSE BLD GHB EST-MCNC: 278 MG/DL
GFR SERPL CREATININE-BSD FRML MDRD: 87 ML/MIN/1.73SQ M
GLUCOSE P FAST SERPL-MCNC: 217 MG/DL (ref 65–99)
HBA1C MFR BLD: 11.3 %
HDLC SERPL-MCNC: 52 MG/DL
LDLC SERPL CALC-MCNC: 113 MG/DL (ref 0–100)
POTASSIUM SERPL-SCNC: 4.2 MMOL/L (ref 3.5–5.3)
PROT SERPL-MCNC: 6.8 G/DL (ref 6.4–8.2)
PTH-INTACT SERPL-MCNC: 84.7 PG/ML (ref 18.4–80.1)
SODIUM SERPL-SCNC: 135 MMOL/L (ref 136–145)
T4 FREE SERPL-MCNC: 1.13 NG/DL (ref 0.76–1.46)
TRIGL SERPL-MCNC: 126 MG/DL
TSH SERPL DL<=0.05 MIU/L-ACNC: 0.75 UIU/ML (ref 0.36–3.74)

## 2021-08-26 PROCEDURE — 86235 NUCLEAR ANTIGEN ANTIBODY: CPT

## 2021-08-26 PROCEDURE — 3046F HEMOGLOBIN A1C LEVEL >9.0%: CPT | Performed by: NURSE PRACTITIONER

## 2021-08-26 PROCEDURE — 80061 LIPID PANEL: CPT

## 2021-08-26 PROCEDURE — 80053 COMPREHEN METABOLIC PANEL: CPT

## 2021-08-26 PROCEDURE — 84443 ASSAY THYROID STIM HORMONE: CPT

## 2021-08-26 PROCEDURE — 1160F RVW MEDS BY RX/DR IN RCRD: CPT | Performed by: NURSE PRACTITIONER

## 2021-08-26 PROCEDURE — 36415 COLL VENOUS BLD VENIPUNCTURE: CPT

## 2021-08-26 PROCEDURE — 1036F TOBACCO NON-USER: CPT | Performed by: NURSE PRACTITIONER

## 2021-08-26 PROCEDURE — 3074F SYST BP LT 130 MM HG: CPT | Performed by: NURSE PRACTITIONER

## 2021-08-26 PROCEDURE — 99214 OFFICE O/P EST MOD 30 MIN: CPT | Performed by: NURSE PRACTITIONER

## 2021-08-26 PROCEDURE — 3078F DIAST BP <80 MM HG: CPT | Performed by: NURSE PRACTITIONER

## 2021-08-26 PROCEDURE — 84439 ASSAY OF FREE THYROXINE: CPT

## 2021-08-26 PROCEDURE — 83036 HEMOGLOBIN GLYCOSYLATED A1C: CPT

## 2021-08-26 PROCEDURE — 83970 ASSAY OF PARATHORMONE: CPT

## 2021-08-26 RX ORDER — BLOOD-GLUCOSE METER
EACH MISCELLANEOUS 3 TIMES DAILY
Qty: 1 KIT | Refills: 0 | Status: SHIPPED | OUTPATIENT
Start: 2021-08-26

## 2021-08-26 RX ORDER — BLOOD SUGAR DIAGNOSTIC
1 STRIP MISCELLANEOUS 3 TIMES DAILY
Qty: 250 EACH | Refills: 3 | Status: SHIPPED | OUTPATIENT
Start: 2021-08-26 | End: 2022-07-28

## 2021-08-26 RX ORDER — INSULIN ASPART 100 [IU]/ML
INJECTION, SOLUTION INTRAVENOUS; SUBCUTANEOUS
Qty: 15 ML | Refills: 0 | Status: SHIPPED | OUTPATIENT
Start: 2021-08-26 | End: 2022-01-13 | Stop reason: SDUPTHER

## 2021-08-26 NOTE — PATIENT INSTRUCTIONS
Take Lantus 30 units before bed, Novolog 5 units before each meal, and Metformin 500 mg with breakfast and dinner  Send in BG log in two weeks   Complete DEXA scan

## 2021-08-26 NOTE — ASSESSMENT & PLAN NOTE
Corrected calcium elevated with mildly elevated PTH on recent labs  Recommend four site dexa scan for further evaluation  Treatment will be based on results

## 2021-08-26 NOTE — PROGRESS NOTES
Established Patient Progress Note      Chief Complaint   Patient presents with    Diabetes Type 2          History of Present Illness:   Sofia Rodríguez is a 68 y o  male with a history of type 2 diabetes with long term use of insulin for 10 years  Reports complications of neuropathy  Last A1C 11 3  He did not bring in BG log or meter to today's  He does not check his BG as they are always high  He is very forgetful/noncompliant with taking his Novolog and Metformin  Denies recent illness or hospitalizations  Denies recent severe hypoglycemic or severe hyperglycemic episodes  Denies any issues with his current regimen  Home glucose monitoring: are not performed      Current regimen: Lantus 30, Novolog 5-5-5 (not taking consistently), Metformin 500 mg BID (not taking consistently)  compliant all of the timedenies any side effects from current medications  Hypoglycemic episodes: No never   H/o of hypoglycemia causing hospitalization or Intervention such as glucagon injection or ambulance call No     Last Eye Exam: needs to schedule   Last Foot Exam: 10/21/20    Has hypertension: Taking Lisinopril   Has hyperlipidemia: Taking Simvastatin         Patient Active Problem List   Diagnosis    DMII (diabetes mellitus, type 2) ([address] )    BPH (benign prostatic hyperplasia)    Dyslipidemia    Essential hypertension    Psoriasis    Obesity (BMI 30-39  9)    Type 2 diabetes mellitus with hyperglycemia, with long-term current use of insulin ([facility])    Vitamin D deficiency    Hypercalcemia    Cough    Tinnitus of both ears    Hyperparathyroidism ([address] )      Past Medical History:   Diagnosis Date    Diabetes mellitus ([address] )       Past Surgical History:   Procedure Laterality Date    COLONOSCOPY W/ POLYPECTOMY      LEG SURGERY Right     TONSILLECTOMY        Family History   Problem Relation Age of Onset    Heart disease Mother     Cancer Sister         bladder     Diabetes Brother     Leukemia Brother      Social History     Tobacco Use    Smoking status: Former Smoker     Types: Cigarettes     Quit date:      Years since quittin 6    Smokeless tobacco: Never Used   Substance Use Topics    Alcohol use: No     Comment: very little     No Known Allergies      Current Outpatient Medications:     Accu-Chek Softclix Lancets lancets, CHECK 3 TIMES A DAY, Disp: 100 each, Rfl: 3    BD Pen Needle Apple U/F 32G X 4 MM MISC, USE FOUR TIMES A DAY, Disp: 100 each, Rfl: 3    Blood Glucose Monitoring Suppl (Accu-Chek Noemy Plus) w/Device KIT, Use 3 (three) times a day, Disp: 1 kit, Rfl: 0    Cholecalciferol (Vitamin D3) 50 MCG (2000 UT) capsule, Take 1 capsule (2,000 Units total) by mouth daily, Disp: 30 capsule, Rfl: 5    glucose blood (Accu-Chek Noemy Plus) test strip, Use 1 each 3 (three) times a day, Disp: 250 each, Rfl: 3    insulin aspart (NovoLOG FlexPen) 100 UNIT/ML injection pen, INJECT 5 UNITS THREE TIMES A DAY, Disp: 15 mL, Rfl: 0    Lantus SoloStar 100 units/mL injection pen, INJECT 30 UNITS UNDER THE SKIN DAILY, Disp: 15 mL, Rfl: 0    lisinopril (ZESTRIL) 5 mg tablet, TAKE 1 TABLET (5 MG TOTAL) BY MOUTH DAILY, Disp: 90 tablet, Rfl: 0    metFORMIN (GLUCOPHAGE) 500 mg tablet, Take 1 tablet (500 mg total) by mouth 2 (two) times a day with meals, Disp: 60 tablet, Rfl: 1    simvastatin (ZOCOR) 20 mg tablet, TAKE 1 TABLET (20 MG TOTAL) BY MOUTH DAILY AT BEDTIME, Disp: 90 tablet, Rfl: 0    Review of Systems   Constitutional: Negative for activity change, appetite change and fatigue  HENT: Negative for sore throat, trouble swallowing and voice change  Eyes: Negative for visual disturbance  Respiratory: Negative for choking, chest tightness and shortness of breath  Cardiovascular: Negative for chest pain, palpitations and leg swelling  Gastrointestinal: Negative for abdominal pain, constipation and diarrhea     Endocrine: Negative for cold intolerance, heat intolerance, polydipsia, polyphagia and polyuria  Genitourinary: Negative for frequency  Musculoskeletal: Negative for arthralgias and myalgias  Skin: Negative for rash  Neurological: Negative for dizziness and syncope  Hematological: Negative for adenopathy  Psychiatric/Behavioral: Negative for sleep disturbance  All other systems reviewed and are negative  Physical Exam:  Body mass index is 33 36 kg/m²  /78   Pulse 70   Ht 5' 2" (1 575 m)   Wt 82 7 kg (182 lb 6 4 oz)   BMI 33 36 kg/m²    Wt Readings from Last 3 Encounters:   08/26/21 82 7 kg (182 lb 6 4 oz)   06/18/21 83 2 kg (183 lb 6 4 oz)   04/28/21 83 5 kg (184 lb 2 oz)       Physical Exam  Vitals reviewed  Constitutional:       General: He is not in acute distress  Appearance: He is well-developed  HENT:      Head: Normocephalic and atraumatic  Eyes:      Conjunctiva/sclera: Conjunctivae normal       Pupils: Pupils are equal, round, and reactive to light  Neck:      Thyroid: No thyromegaly  Cardiovascular:      Rate and Rhythm: Normal rate and regular rhythm  Heart sounds: Normal heart sounds  No murmur heard  Pulmonary:      Effort: Pulmonary effort is normal  No respiratory distress  Breath sounds: Normal breath sounds  No wheezing or rales  Abdominal:      General: Bowel sounds are normal  There is no distension  Palpations: Abdomen is soft  Tenderness: There is no abdominal tenderness  Musculoskeletal:         General: Normal range of motion  Cervical back: Normal range of motion and neck supple  Lymphadenopathy:      Cervical: No cervical adenopathy  Skin:     General: Skin is warm and dry  Neurological:      Mental Status: He is alert and oriented to person, place, and time             Labs:     Lab Results   Component Value Date    HGBA1C 11 3 (H) 08/26/2021     Component      Latest Ref Rng & Units 8/26/2021   Sodium      136 - 145 mmol/L 135 (L)   Potassium      3 5 - 5 3 mmol/L 4 2   Chloride      100 - 108 mmol/L 105   CO2      21 - 32 mmol/L 29   Anion Gap      4 - 13 mmol/L 1 (L)   BUN      5 - 25 mg/dL 9   Creatinine      0 60 - 1 30 mg/dL 0 79   GLUCOSE FASTING      65 - 99 mg/dL 217 (H)   Calcium      8 3 - 10 1 mg/dL 10 2 (H)   CORRECTED CALCIUM      8 3 - 10 1 mg/dL 10 9 (H)   AST      5 - 45 U/L 18   ALT      12 - 78 U/L 28   Alkaline Phosphatase      46 - 116 U/L 87   Total Protein      6 4 - 8 2 g/dL 6 8   Albumin      3 5 - 5 0 g/dL 3 1 (L)   TOTAL BILIRUBIN      0 20 - 1 00 mg/dL 0 52   eGFR      ml/min/1 73sq m 87       Lab Results   Component Value Date    CHOL 157 03/24/2015    HDL 52 08/26/2021    TRIG 126 08/26/2021       Lab Results   Component Value Date    CZA0KBGSQNXO 0 753 08/26/2021    VBX6BCZFEHHU 0 708 10/21/2020    [id number] 0 847 05/07/2019     Lab Results   Component Value Date    FREET4 1 13 08/26/2021     Component      Latest Ref Rng & Units 8/26/2021   PARATHYROID HORMONE      18 4 - 80 1 pg/mL 84 7 (H)       Impression & Plan:    Problem List Items Addressed This Visit        Endocrine    DMII (diabetes mellitus, type 2) ([facility] [address] )     Uncontrolled/poorly controlled due to noncompliance with Novolog and Metformin, forgets majority of doses  Is taking Lantus consistently  Provided written instructions for insulin and Metformin  Stressed importance of compliance to improve BG control  Script provided for new meter  Instructed to check BG 3-4x per day and send in BG log in 1-2 weeks for review  Continue current regimen for now            Relevant Medications    insulin aspart (NovoLOG FlexPen) 100 UNIT/ML injection pen    glucose blood (Accu-Chek Noemy Plus) test strip    Other Relevant Orders    Hemoglobin A1C    Comprehensive metabolic panel    Lipid Panel with Direct LDL reflex    Microalbumin / creatinine urine ratio    Vitamin D 25 hydroxy Lab Collect    PTH, intact Lab Collect Lab Collect    Phosphorus Lab Collect    Type 2 diabetes mellitus with hyperglycemia, with long-term current use of insulin ([facility]) - Primary    Relevant Medications    insulin aspart (NovoLOG FlexPen) 100 UNIT/ML injection pen    Blood Glucose Monitoring Suppl (Accu-Chek Noemy Plus) w/Device KIT    Other Relevant Orders    Hemoglobin A1C    Comprehensive metabolic panel    Lipid Panel with Direct LDL reflex    Microalbumin / creatinine urine ratio    Vitamin D 25 hydroxy Lab Collect    PTH, intact Lab Collect Lab Collect    Phosphorus Lab Collect    Hyperparathyroidism ([address] )     Corrected calcium elevated with mildly elevated PTH on recent labs  Recommend four site dexa scan for further evaluation  Treatment will be based on results  Relevant Orders    Comprehensive metabolic panel    Vitamin D 25 hydroxy Lab Collect    PTH, intact Lab Collect Lab Collect    Phosphorus Lab Collect    DXA bone density spine hip and pelvis       Cardiovascular and Mediastinum    Essential hypertension     BP stable, continue current regimen          Relevant Orders    Comprehensive metabolic panel       Other    Dyslipidemia     Continue statin          Relevant Orders    Lipid Panel with Direct LDL reflex    Vitamin D deficiency    Relevant Orders    Vitamin D 25 hydroxy Lab Collect    Hypercalcemia    Relevant Orders    Comprehensive metabolic panel    DXA bone density spine hip and pelvis          Orders Placed This Encounter   Procedures    DXA bone density spine hip and pelvis     Four site dexa scan with distal forearm     Standing Status:   Future     Standing Expiration Date:   8/26/2025     Scheduling Instructions:      Please wear comfortable clothing with no metal buttons, zippers, snaps or an underwire bra  Do not take any calcium supplements 24 hours prior to your test  Please bring your insurance cards, a form of photo ID and a list of your medications with you  Arrive 5-10 minutes prior to your appointment time in order to register              Your study cannot be performed if you take your calcium supplement 24 hours before the scheduled Dexa scan examination  To schedule this appointment, please contact Central Scheduling at [phone]  Order Specific Question:   Reason for Exam:     Answer:   hyperparathyroidism and hypercalcemia    Hemoglobin A1C     Standing Status:   Future     Standing Expiration Date:   8/26/2022    Comprehensive metabolic panel     This is a patient instruction: Patient fasting for 8 hours or longer recommended  Standing Status:   Future     Standing Expiration Date:   8/26/2022    Lipid Panel with Direct LDL reflex     This is a patient instruction: This test requires patient fasting for 10-12 hours or longer  Drinking of black coffee or black tea is acceptable  Standing Status:   Future     Standing Expiration Date:   8/26/2022    Microalbumin / creatinine urine ratio     Standing Status:   Future     Standing Expiration Date:   8/26/2022    Vitamin D 25 hydroxy Lab Collect     Standing Status:   Future     Standing Expiration Date:   8/26/2022    PTH, intact Lab Collect Lab Collect     Standing Status:   Future     Standing Expiration Date:   8/26/2022    Phosphorus Lab Collect     Standing Status:   Future     Standing Expiration Date:   8/26/2022       Patient Instructions   Take Lantus 30 units before bed, Novolog 5 units before each meal, and Metformin 500 mg with breakfast and dinner  Send in BG log in two weeks   Complete DEXA scan         Discussed with the patient and all questioned fully answered  He will call me if any problems arise        Counseled patient on diagnostic results, prognosis, risk and benefit of treatment options, instruction for management, importance of treatment compliance, Risk  factor reduction and impressions      Bernabe Richter [address]

## 2021-08-26 NOTE — ASSESSMENT & PLAN NOTE
Uncontrolled/poorly controlled due to noncompliance with Novolog and Metformin, forgets majority of doses  Is taking Lantus consistently  Provided written instructions for insulin and Metformin  Stressed importance of compliance to improve BG control  Script provided for new meter  Instructed to check BG 3-4x per day and send in BG log in 1-2 weeks for review  Continue current regimen for now

## 2021-08-27 LAB
ENA SS-A AB SER-ACNC: <0.2 AI (ref 0–0.9)
ENA SS-B AB SER-ACNC: <0.2 AI (ref 0–0.9)

## 2021-09-01 ENCOUNTER — PATIENT OUTREACH (OUTPATIENT)
Dept: FAMILY MEDICINE CLINIC | Facility: CLINIC | Age: 76
End: 2021-09-01

## 2021-09-01 NOTE — PROGRESS NOTES
Spoke with Celeste Newton  He said he did not get results of blood work at his endocrinology appointment  Informed that Hemoglobin a1C I 11 3  He has not started novolog insulin yet  But is taking his oral medications  [address] will be switching him to pill pack system at his next refill  He ordered a new glucometer also and need to pick that up  I will check back in 1-2 weeks

## 2021-09-15 ENCOUNTER — PATIENT OUTREACH (OUTPATIENT)
Dept: FAMILY MEDICINE CLINIC | Facility: CLINIC | Age: 76
End: 2021-09-15

## 2021-09-15 NOTE — PROGRESS NOTES
Spoke with Ashlie Rodriguez he has not started pill packing yet  He said he needs to do that next time he goes in for refill  He did  glucometer not checkin his blood sugar daily but a fasting sugar was 267  Discussed importance of daily blood sugar checks  Not taking Novolog insulin we talked about importance of doing so  Takes his oral medications when he remembers  I will mail more diabetic diet information  Had a discussion over food choices  Also talked about making eye appointment  I will send Dr Eleonora Munoz information to him  I will check back in one month

## 2021-09-22 ENCOUNTER — VBI (OUTPATIENT)
Dept: ADMINISTRATIVE | Facility: OTHER | Age: 76
End: 2021-09-22

## 2021-09-22 LAB
LEFT EYE DIABETIC RETINOPATHY: NORMAL
RIGHT EYE DIABETIC RETINOPATHY: NORMAL

## 2021-09-28 NOTE — TELEPHONE ENCOUNTER
Upon review of the In Basket request we were able to locate, review, and update the patient chart as requested for Diabetic Eye Exam     Any additional questions or concerns should be emailed to the Practice Liaisons via [email or web address] email, please do not reply via In Basket      Thank you  Karma Staley

## 2021-10-04 DIAGNOSIS — I10 ESSENTIAL HYPERTENSION: ICD-10-CM

## 2021-10-04 DIAGNOSIS — E11.9 TYPE 2 DIABETES MELLITUS WITHOUT COMPLICATION, WITHOUT LONG-TERM CURRENT USE OF INSULIN (HCC): ICD-10-CM

## 2021-10-04 RX ORDER — SIMVASTATIN 20 MG
20 TABLET ORAL
Qty: 90 TABLET | Refills: 0 | Status: SHIPPED | OUTPATIENT
Start: 2021-10-04 | End: 2022-01-24

## 2021-10-04 RX ORDER — LISINOPRIL 5 MG/1
5 TABLET ORAL DAILY
Qty: 90 TABLET | Refills: 0 | Status: SHIPPED | OUTPATIENT
Start: 2021-10-04 | End: 2022-01-24

## 2021-10-19 DIAGNOSIS — E11.65 TYPE 2 DIABETES MELLITUS WITH HYPERGLYCEMIA, WITHOUT LONG-TERM CURRENT USE OF INSULIN (HCC): ICD-10-CM

## 2021-10-20 ENCOUNTER — PATIENT OUTREACH (OUTPATIENT)
Dept: FAMILY MEDICINE CLINIC | Facility: CLINIC | Age: 76
End: 2021-10-20

## 2021-10-20 RX ORDER — INSULIN GLARGINE 100 [IU]/ML
30 INJECTION, SOLUTION SUBCUTANEOUS DAILY
Qty: 15 ML | Refills: 0 | Status: SHIPPED | OUTPATIENT
Start: 2021-10-20 | End: 2021-12-13

## 2021-11-05 ENCOUNTER — TELEPHONE (OUTPATIENT)
Dept: ENDOCRINOLOGY | Facility: CLINIC | Age: 76
End: 2021-11-05

## 2021-12-06 ENCOUNTER — RA CDI HCC (OUTPATIENT)
Dept: OTHER | Facility: HOSPITAL | Age: 76
End: 2021-12-06

## 2021-12-13 DIAGNOSIS — E11.65 TYPE 2 DIABETES MELLITUS WITH HYPERGLYCEMIA, WITHOUT LONG-TERM CURRENT USE OF INSULIN (HCC): ICD-10-CM

## 2021-12-13 RX ORDER — INSULIN GLARGINE 100 [IU]/ML
30 INJECTION, SOLUTION SUBCUTANEOUS DAILY
Qty: 15 ML | Refills: 0 | Status: SHIPPED | OUTPATIENT
Start: 2021-12-13 | End: 2022-01-13 | Stop reason: SDUPTHER

## 2021-12-15 ENCOUNTER — PATIENT OUTREACH (OUTPATIENT)
Dept: FAMILY MEDICINE CLINIC | Facility: CLINIC | Age: 76
End: 2021-12-15

## 2021-12-22 ENCOUNTER — PATIENT OUTREACH (OUTPATIENT)
Dept: FAMILY MEDICINE CLINIC | Facility: CLINIC | Age: 76
End: 2021-12-22

## 2021-12-29 ENCOUNTER — PATIENT OUTREACH (OUTPATIENT)
Dept: FAMILY MEDICINE CLINIC | Facility: CLINIC | Age: 76
End: 2021-12-29

## 2022-01-13 ENCOUNTER — OFFICE VISIT (OUTPATIENT)
Dept: ENDOCRINOLOGY | Facility: CLINIC | Age: 77
End: 2022-01-13
Payer: COMMERCIAL

## 2022-01-13 VITALS
BODY MASS INDEX: 32.61 KG/M2 | WEIGHT: 177.2 LBS | SYSTOLIC BLOOD PRESSURE: 144 MMHG | HEART RATE: 76 BPM | DIASTOLIC BLOOD PRESSURE: 100 MMHG | HEIGHT: 62 IN

## 2022-01-13 DIAGNOSIS — E78.5 DYSLIPIDEMIA: ICD-10-CM

## 2022-01-13 DIAGNOSIS — Z79.4 TYPE 2 DIABETES MELLITUS WITH HYPERGLYCEMIA, WITH LONG-TERM CURRENT USE OF INSULIN (HCC): Primary | ICD-10-CM

## 2022-01-13 DIAGNOSIS — I10 ESSENTIAL HYPERTENSION: ICD-10-CM

## 2022-01-13 DIAGNOSIS — E11.9 DM TYPE 2, GOAL HBA1C < 7% (HCC): ICD-10-CM

## 2022-01-13 DIAGNOSIS — E11.65 TYPE 2 DIABETES MELLITUS WITH HYPERGLYCEMIA, WITHOUT LONG-TERM CURRENT USE OF INSULIN (HCC): Primary | ICD-10-CM

## 2022-01-13 DIAGNOSIS — E83.52 HYPERCALCEMIA: ICD-10-CM

## 2022-01-13 DIAGNOSIS — E11.65 TYPE 2 DIABETES MELLITUS WITH HYPERGLYCEMIA, WITHOUT LONG-TERM CURRENT USE OF INSULIN (HCC): ICD-10-CM

## 2022-01-13 DIAGNOSIS — E11.9 TYPE 2 DIABETES MELLITUS WITHOUT COMPLICATION, WITHOUT LONG-TERM CURRENT USE OF INSULIN (HCC): ICD-10-CM

## 2022-01-13 DIAGNOSIS — E55.9 VITAMIN D DEFICIENCY: ICD-10-CM

## 2022-01-13 DIAGNOSIS — E11.65 TYPE 2 DIABETES MELLITUS WITH HYPERGLYCEMIA, WITH LONG-TERM CURRENT USE OF INSULIN (HCC): Primary | ICD-10-CM

## 2022-01-13 LAB — SL AMB POCT HEMOGLOBIN AIC: 11.6 (ref ?–6.5)

## 2022-01-13 PROCEDURE — 1036F TOBACCO NON-USER: CPT | Performed by: INTERNAL MEDICINE

## 2022-01-13 PROCEDURE — 1160F RVW MEDS BY RX/DR IN RCRD: CPT | Performed by: INTERNAL MEDICINE

## 2022-01-13 PROCEDURE — 3077F SYST BP >= 140 MM HG: CPT | Performed by: INTERNAL MEDICINE

## 2022-01-13 PROCEDURE — 3080F DIAST BP >= 90 MM HG: CPT | Performed by: INTERNAL MEDICINE

## 2022-01-13 PROCEDURE — 99214 OFFICE O/P EST MOD 30 MIN: CPT | Performed by: INTERNAL MEDICINE

## 2022-01-13 PROCEDURE — 83036 HEMOGLOBIN GLYCOSYLATED A1C: CPT

## 2022-01-13 RX ORDER — INSULIN GLARGINE 100 [IU]/ML
30 INJECTION, SOLUTION SUBCUTANEOUS DAILY
Qty: 15 ML | Refills: 0 | Status: SHIPPED | OUTPATIENT
Start: 2022-01-13 | End: 2022-02-12

## 2022-01-13 RX ORDER — INSULIN ASPART 100 [IU]/ML
INJECTION, SOLUTION INTRAVENOUS; SUBCUTANEOUS
Qty: 15 ML | Refills: 0 | Status: SHIPPED | OUTPATIENT
Start: 2022-01-13 | End: 2022-02-12

## 2022-01-13 NOTE — PROGRESS NOTES
Preston Buchanan 68 y o  male MRN: [id number]    Encounter: [id number]      Assessment/Plan     Problem List Items Addressed This Visit        Endocrine    DMII (diabetes mellitus, type 2) ([facility])    Relevant Medications    metFORMIN (GLUCOPHAGE) 500 mg tablet    insulin glargine (Lantus SoloStar) 100 units/mL injection pen    insulin aspart (NovoLOG FlexPen) 100 UNIT/ML injection pen    Other Relevant Orders    Comprehensive metabolic panel Lab Collect    Microalbumin / creatinine urine ratio Lab Collect    T4, free Lab Collect    TSH, 3rd generation Lab Collect    Ambulatory referral to Diabetic Education    Ambulatory referral to Diabetic Education    Type 2 diabetes mellitus with hyperglycemia, with long-term current use of insulin ([facility] [address] ) - Primary       Lab Results   Component Value Date    HGBA1C 11 6 (A) 01/13/2022   Diabetes is poorly controlled-patient counseled complications uncontrolled diabetes including retinopathy, nephropathy neuropathy  He is not taking his medications as directed, has not seen the nutritionist and has not been checking his blood sugars as directed  He was counseled on the importance of taking medications as directed  Now advised to take Lantus 30 units at bedtime  NovoLog 8 units before meals  Discussed starting GLP 1 agonist with the patient  Side effects  discussed  He is reluctant now and wants think about it    Is interested in getting a personal continuous glucose monitor         Relevant Medications    metFORMIN (GLUCOPHAGE) 500 mg tablet    insulin glargine (Lantus SoloStar) 100 units/mL injection pen    insulin aspart (NovoLOG FlexPen) 100 UNIT/ML injection pen    Other Relevant Orders    Comprehensive metabolic panel Lab Collect    Microalbumin / creatinine urine ratio Lab Collect    T4, free Lab Collect    TSH, 3rd generation Lab Collect    Ambulatory referral to Diabetic Education       Cardiovascular and Mediastinum    Essential hypertension    Relevant Medications metFORMIN (GLUCOPHAGE) 500 mg tablet    Other Relevant Orders    Comprehensive metabolic panel Lab Collect    Ambulatory referral to Diabetic Education       Other    Dyslipidemia    Relevant Medications    metFORMIN (GLUCOPHAGE) 500 mg tablet    Other Relevant Orders    Comprehensive metabolic panel Lab Collect    Lipid Panel with Direct LDL reflex Lab Collect    Ambulatory referral to Diabetic Education    Hypercalcemia    Relevant Orders    Comprehensive metabolic panel Lab Collect    PTH, intact Lab Collect Lab Collect    Phosphorus Lab Collect    Vitamin D 25 hydroxy Lab Collect    Creatinine, urine, 24 hour Lab Collect    Calcium, urine, 24 hour Lab Collect    Vitamin D deficiency      Other Visit Diagnoses     DM type 2, goal HbA1c < 7% ([facility])        Relevant Medications    metFORMIN (GLUCOPHAGE) 500 mg tablet    insulin glargine (Lantus SoloStar) 100 units/mL injection pen    insulin aspart (NovoLOG FlexPen) 100 UNIT/ML injection pen        CC: Diabetes    History of Present Illness     HPI:  51-year-old male here to  follow-up on  uncontrolled type 2 diabetes on insulin therapy  Current regimen-  lantus 30 units daily , metformin   novolog - has not been taking   Eats once 1  Meal  a day     Lost 5 lbs since last visit   Checks f s once every other day - fasting 200s           Review of Systems   Constitutional: Negative for unexpected weight change  Eyes: Positive for visual disturbance  Gastrointestinal: Negative for constipation, diarrhea and vomiting  Endocrine: Positive for polyuria  Negative for polydipsia  Neurological: Negative for numbness         Historical Information   Past Medical History:   Diagnosis Date    Diabetes mellitus ([facility] [address] )      Past Surgical History:   Procedure Laterality Date    COLONOSCOPY W/ POLYPECTOMY      LEG SURGERY Right     TONSILLECTOMY       Social History   Social History     Substance and Sexual Activity   Alcohol Use No    Comment: very little     Social History     Substance and Sexual Activity   Drug Use No     Social History     Tobacco Use   Smoking Status Former Smoker    Types: Cigarettes    Quit date: 46    Years since quittin 0   Smokeless Tobacco Never Used     Family History:   Family History   Problem Relation Age of Onset    Heart disease Mother     Cancer Sister         bladder     Diabetes Brother     Leukemia Brother        Meds/Allergies   Current Outpatient Medications   Medication Sig Dispense Refill    Accu-Chek Softclix Lancets lancets CHECK 3 TIMES A  each 3    BD Pen Needle Apple U/F 32G X 4 MM MISC USE FOUR TIMES A  each 3    Blood Glucose Monitoring Suppl (Accu-Chek Noemy Plus) w/Device KIT Use 3 (three) times a day 1 kit 0    glucose blood (Accu-Chek Noemy Plus) test strip Use 1 each 3 (three) times a day 250 each 3    insulin glargine (Lantus SoloStar) 100 units/mL injection pen Inject 30 Units under the skin daily 15 mL 0    lisinopril (ZESTRIL) 5 mg tablet TAKE 1 TABLET (5 MG TOTAL) BY MOUTH DAILY 90 tablet 0    metFORMIN (GLUCOPHAGE) 500 mg tablet Take 1 tablet (500 mg total) by mouth 2 (two) times a day with meals 60 tablet 1    simvastatin (ZOCOR) 20 mg tablet TAKE 1 TABLET (20 MG TOTAL) BY MOUTH DAILY AT BEDTIME 90 tablet 0    Cholecalciferol (Vitamin D3) 50 MCG (2000 UT) capsule Take 1 capsule (2,000 Units total) by mouth daily (Patient not taking: Reported on 2022 ) 30 capsule 5    insulin aspart (NovoLOG FlexPen) 100 UNIT/ML injection pen INJECT 8 units before meals 15 mL 0     No current facility-administered medications for this visit  No Known Allergies    Objective   Vitals: Blood pressure 144/100, pulse 76, height 5' 2" (1 575 m), weight 80 4 kg (177 lb 3 2 oz)  Physical Exam  Vitals reviewed  Constitutional:       Appearance: Normal appearance  He is obese  He is not ill-appearing or diaphoretic  HENT:      Head: Normocephalic and atraumatic     Eyes:      General: No scleral icterus  Extraocular Movements: Extraocular movements intact  Cardiovascular:      Rate and Rhythm: Normal rate and regular rhythm  Heart sounds: Normal heart sounds  No murmur heard  Pulmonary:      Effort: Pulmonary effort is normal  No respiratory distress  Breath sounds: Normal breath sounds  No wheezing  Abdominal:      General: There is no distension  Palpations: Abdomen is soft  Tenderness: There is no abdominal tenderness  Musculoskeletal:      Cervical back: Neck supple  Right lower leg: No edema  Left lower leg: No edema  Lymphadenopathy:      Cervical: No cervical adenopathy  Skin:     General: Skin is warm and dry  Neurological:      General: No focal deficit present  Mental Status: He is alert and oriented to person, place, and time  Psychiatric:         Mood and Affect: Mood normal          Behavior: Behavior normal          Thought Content: Thought content normal          Judgment: Judgment normal          The history was obtained from the review of the chart, patient  Lab Results:   Lab Results   Component Value Date/Time    Hemoglobin A1C 11 6 (A) 01/13/2022 09:58 AM    Hemoglobin A1C 11 3 (H) 08/26/2021 07:27 AM    Hemoglobin A1C 11 8 (A) 04/28/2021 01:14 PM    BUN 9 08/26/2021 07:27 AM    Potassium 4 2 08/26/2021 07:27 AM    Chloride 105 08/26/2021 07:27 AM    CO2 29 08/26/2021 07:27 AM    Creatinine 0 79 08/26/2021 07:27 AM    AST 18 08/26/2021 07:27 AM    ALT 28 08/26/2021 07:27 AM    Albumin 3 1 (L) 08/26/2021 07:27 AM    HDL, Direct 52 08/26/2021 07:27 AM    Triglycerides 126 08/26/2021 07:27 AM           Imaging Studies:     Portions of the record may have been created with voice recognition software  Occasional wrong word or "sound a like" substitutions may have occurred due to the inherent limitations of voice recognition software  Read the chart carefully and recognize, using context, where substitutions have occurred

## 2022-01-13 NOTE — PATIENT INSTRUCTIONS
Hypoglycemia in a Person with Diabetes   WHAT YOU NEED TO KNOW:   Hypoglycemia is a serious condition that happens when your blood glucose (sugar) level drops too low  The blood sugar level is usually too high in a person with diabetes, but the level can also drop too low  It is important to follow your diabetes management plan to keep your blood sugar level steady  DISCHARGE INSTRUCTIONS:   You or someone close to you needs to call the local emergency number (911 in the [address] [facility],[address]) if:   · You have a seizure or pass out  · Your blood sugar is less than 50 mg/dL and does not respond to treatment  · You feel you are going to pass out  · You have trouble thinking clearly  Call your diabetes care team if:   · You have had symptoms of low blood sugar several times  · You have questions about the amount of insulin or diabetes medicine you are taking  · You have questions or concerns about your condition or care  Medicines:   · Insulin or diabetes medicine  help to keep your blood sugar under control  · Glucagon  may be needed if you have severe hypoglycemia  · Take your medicine as directed  Contact your healthcare provider if you think your medicine is not helping or if you have side effects  Tell him or her if you are allergic to any medicine  Keep a list of the medicines, vitamins, and herbs you take  Include the amounts, and when and why you take them  Bring the list or the pill bottles to follow-up visits  Carry your medicine list with you in case of an emergency  Manage hypoglycemia:   · Check your blood sugar level right away if you have symptoms of hypoglycemia  Hypoglycemia usually happens when your blood sugar level is 70 mg/dL or below  Ask your diabetes care team what blood sugar level is too low for you  · If your blood sugar level is too low, eat or drink 15 grams of fast-acting carbohydrate    Examples of this amount of fast-acting carbohydrate are 4 ounces (½ cup) of fruit juice or 4 ounces of regular soda  Other examples are 2 tablespoons of raisins or 1 tube of glucose gel  Check your blood sugar level 15 minutes later  Sit still as you wait  If the level is still low (less than 100 mg/dL), have another 15 grams of carbohydrate  When the level returns to 100 mg/dL, eat a meal if it is time  If your meal time is more than 1 hour away, eat a snack  The snack should contain carbohydrates, such as the following:     ? 3/4 cup of cereal    ? 1 cup of skim or low fat milk    ? 6 soda crackers    ? 1/2 of a turkey sandwich    ? 15 fat-free chips  This will help prevent another drop in blood sugar  Always carefully follow your diabetes care team's instructions on how to treat low blood sugar levels  · Always carry a source of fast-acting carbohydrate  If you have symptoms of hypoglycemia and you do not have a blood glucose meter, have a source of fast-acting carbohydrate anyway  Avoid carbohydrate foods that are high in fat  The fat content may make the carbohydrate take longer to increase your blood sugar level  Ask your diabetes care team if you should carry a glucagon kit  Glucagon is a medicine that is injected when you develop severe hypoglycemia and become unconscious  Check the expiration date every month and replace it before it expires  · Teach others how to help you if you have symptoms of hypoglycemia  Tell them about the symptoms of hypoglycemia  Ask them to give you a source of fast-acting carbohydrate if you cannot get it yourself  Ask them to give you a glucagon injection if you have signs of hypoglycemia and you become unconscious or have a seizure  Ask them to call the local emergency number (911 in the [address] [place] [address],[address])   This is an emergency  Tell them never to try to make you swallow anything if you faint or have a seizure  · Wear medical alert jewelry  or carry a card that says you have diabetes  Ask where to get these items         Prevent hypoglycemia:   · Take diabetes medicine as directed  Take your medicine at the right time and in the right amount  Do not  double the amount of medicine you take unless instructed by your diabetes care team      · Eat regular meals and snacks  Talk to your dietitian or diabetes care team about a meal plan that is right for you  Do not skip meals  · Check your blood sugar level as directed  Ask your diabetes care team what your blood sugar levels should be before and after you eat  Ask when and how often to check your blood sugar level  You may need to check at least 3 times each day  Record your blood sugar level results and take the record with you when you see your care team  Changes may need to be made to your medicine, food, or exercise schedules using the record  · Check your blood sugar level before you exercise  Physical activity, such as exercise, can decrease your blood sugar level  If your blood sugar level is less than 100 mg/dL, have a carbohydrate snack  Examples are 4 to 6 crackers, ½ banana, 8 ounces (1 cup) of nonfat or 1% milk, or 4 ounces (½ cup) of juice  If you will be active for more than 1 hour, you may need to check your blood sugar level every 30 minutes  Your diabetes care team may also recommend that you check your blood sugar level after your activity  · Know the risks if you choose to drink alcohol  Alcohol can cause your blood sugar levels to be low if you use insulin  Alcohol can cause high blood sugar levels and weight gain if you drink too much  Women 21 years or older and men 72 years or older should limit alcohol to 1 drink a day  Men aged 24 to 59 years should limit alcohol to 2 drinks a day  A drink of alcohol is 12 ounces of beer, 5 ounces of wine, or 1½ ounces of liquor  Follow up with your diabetes care team or specialist as directed: You may need your insulin or diabetes medicine changed if you continue to have hypoglycemia episodes   Write down your questions so you remember to ask them during your visits  © Copyright [employer] 2021 Information is for End User's use only and may not be sold, redistributed or otherwise used for commercial purposes  All illustrations and images included in CareNotes® are the copyrighted property of A D A M , Inc  or Cristiane Arias  The above information is an  only  It is not intended as medical advice for individual conditions or treatments  Talk to your doctor, nurse or pharmacist before following any medical regimen to see if it is safe and effective for you  Semaglutide (By injection)   Semaglutide ([id number])  Treats type 2 diabetes  Lowers the risk of heart attack, stroke, or death in patients with type 2 diabetes and heart or blood vessel disease  Also used to help lose weight and keep the weight off in patients with obesity caused by certain conditions  Brand Name(s): Ozempic 0 25 MG or 0 5 MG Doses, Ozempic 1 MG Doses, Wegovy   There may be other brand names for this medicine  When This Medicine Should Not Be Used: This medicine is not right for everyone  Do not use it if you had an allergic reaction to semaglutide, or if you have multiple endocrine neoplasia syndrome type 2 (MEN 2) or if you or anyone in your family has had medullary thyroid cancer  How to Use This Medicine:   Injectable  · Your doctor will prescribe your exact dose and tell you how often it should be given  This medicine is given as a shot under your skin  It is given into your stomach, thigh, or upper arm  · You may be taught how to give your medicine at home  Make sure you understand all instructions before giving yourself an injection  Do not use more medicine or use it more often than your doctor tells you to  · If you use insulin in addition to this medicine, do not mix them into the same syringe  You may give the shots in the same area (including your stomach), but do not give the shots right next to each other    · Check the liquid in the pen  It should be clear and colorless  Do not use it if it is cloudy, discolored, or has particles in it  · You will be shown the body areas where this shot can be given  Use a different body area each time you give yourself a shot  Keep track of where you give each shot to make sure you rotate body areas  · Use a new needle and syringe each time you inject your medicine  · Never share medicine pens with others under any circumstances  Sharing needles or pens can result in transmission of infection  · This medicine should come with a Medication Guide  Ask your pharmacist for a copy if you do not have one  · Missed dose:   ? Ozempic®: If you miss a dose of this medicine, use it as soon as possible within 5 days after the missed dose  If you miss a dose for more than 5 days, skip the missed dose and go back to your regular dosing schedule  ? Wegovy: If you miss a dose, and the next scheduled dose is more than 2 days away, use it as soon as possible  If you miss a dos, and the next scheduled dose is less than 2 days away, skip the missed dose and go back to your regular dosing schedule  If you miss a dose of this medicine for more than 2 weeks, use it on the next scheduled dose  Ask your doctor about how to restart your treatment  · Store your new, unused medicine pen in its original carton in the refrigerator  Do not freeze  You may store the opened Ozempic® pen in the refrigerator or at room temperature for 56 days or the opened [facility] pen in the refrigerator or at room temperature for 28 days  Throw away the pen after you use it for 56 days for Ozempic® or 28 days for [facility], even if it still has medicine in it  Drugs and Foods to Avoid:   Ask your doctor or pharmacist before using any other medicine, including over-the-counter medicines, vitamins, and herbal products  · Some medicines may affect how semaglutide works   Tell your doctor if you are using other diabetes medicine (including glimepiride, glipizide, glyburide, or insulin) or any oral medicine  Warnings While Using This Medicine:   · Tell your doctor if you are pregnant or planning to become pregnant  Do not use this medicine for at least 2 months before you plan to become pregnant  · Tell your doctor if you are breastfeeding, or if you have kidney disease, pancreas problems, diabetes, digestion problems, or a history of diabetic retinopathy or depression  · This medicine may cause the following problems:  ? Increased risk of thyroid tumor  ? Pancreatitis (swelling of the pancreas)  ? Gallbladder problems  ? Low blood sugar (when used with other diabetes medicine)  ? Kidney problems  ? Eye or vision problems, including diabetic retinopathy  ? Increased heart rate  ? Increased risk for depression or thoughts of suicide  · Your doctor will do lab tests at regular visits to check on the effects of this medicine  Keep all appointments  · Keep all medicine out of the reach of children  Never share your medicine with anyone    Possible Side Effects While Using This Medicine:   Call your doctor right away if you notice any of these side effects:  · Allergic reaction: Itching or hives, swelling in your face or hands, swelling or tingling in your mouth or throat, chest tightness, trouble breathing  · Blurred vision or any other change in vision  · Change in how much or how often you urinate, lower back or side pain, blood in your urine  · Shaking, trembling, sweating, fast or pounding heartbeat, lightheadedness, hunger, confusion  · Sudden and severe stomach pain, nausea, vomiting, fever, yellow eyes or skin  · Unusual moods or behaviors, depression, thoughts of hurting yourself or others  If you notice these less serious side effects, talk with your doctor:   · Constipation, diarrhea, passing gas, stomach upset or bloating  · Headache, dizziness  · Redness, itching, bump, swelling, or any changes in your skin where the shot was given  · Tiredness  If you notice other side effects that you think are caused by this medicine, tell your doctor  Call your doctor for medical advice about side effects  You may report side effects to FDA at 9-566-VNN-0244    © Copyright [employer] 2021 Information is for End User's use only and may not be sold, redistributed or otherwise used for commercial purposes  The above information is an  only  It is not intended as medical advice for individual conditions or treatments  Talk to your doctor, nurse or pharmacist before following any medical regimen to see if it is safe and effective for you  Hypoglycemia in a Person with Diabetes   WHAT YOU NEED TO KNOW:   Hypoglycemia is a serious condition that happens when your blood glucose (sugar) level drops too low  The blood sugar level is usually too high in a person with diabetes, but the level can also drop too low  It is important to follow your diabetes management plan to keep your blood sugar level steady  DISCHARGE INSTRUCTIONS:   You or someone close to you needs to call the local emergency number (911 in the [address] [facility]) if:   · You have a seizure or pass out  · Your blood sugar is less than 50 mg/dL and does not respond to treatment  · You feel you are going to pass out  · You have trouble thinking clearly  Call your diabetes care team if:   · You have had symptoms of low blood sugar several times  · You have questions about the amount of insulin or diabetes medicine you are taking  · You have questions or concerns about your condition or care  Medicines:   · Insulin or diabetes medicine  help to keep your blood sugar under control  · Glucagon  may be needed if you have severe hypoglycemia  · Take your medicine as directed  Contact your healthcare provider if you think your medicine is not helping or if you have side effects  Tell him or her if you are allergic to any medicine   Keep a list of the medicines, vitamins, and herbs you take  Include the amounts, and when and why you take them  Bring the list or the pill bottles to follow-up visits  Carry your medicine list with you in case of an emergency  Manage hypoglycemia:   · Check your blood sugar level right away if you have symptoms of hypoglycemia  Hypoglycemia usually happens when your blood sugar level is 70 mg/dL or below  Ask your diabetes care team what blood sugar level is too low for you  · If your blood sugar level is too low, eat or drink 15 grams of fast-acting carbohydrate  Examples of this amount of fast-acting carbohydrate are 4 ounces (½ cup) of fruit juice or 4 ounces of regular soda  Other examples are 2 tablespoons of raisins or 1 tube of glucose gel  Check your blood sugar level 15 minutes later  Sit still as you wait  If the level is still low (less than 100 mg/dL), have another 15 grams of carbohydrate  When the level returns to 100 mg/dL, eat a meal if it is time  If your meal time is more than 1 hour away, eat a snack  The snack should contain carbohydrates, such as the following:     ? 3/4 cup of cereal    ? 1 cup of skim or low fat milk    ? 6 soda crackers    ? 1/2 of a turkey sandwich    ? 15 fat-free chips  This will help prevent another drop in blood sugar  Always carefully follow your diabetes care team's instructions on how to treat low blood sugar levels  · Always carry a source of fast-acting carbohydrate  If you have symptoms of hypoglycemia and you do not have a blood glucose meter, have a source of fast-acting carbohydrate anyway  Avoid carbohydrate foods that are high in fat  The fat content may make the carbohydrate take longer to increase your blood sugar level  Ask your diabetes care team if you should carry a glucagon kit  Glucagon is a medicine that is injected when you develop severe hypoglycemia and become unconscious  Check the expiration date every month and replace it before it expires      · Teach others how to help you if you have symptoms of hypoglycemia  Tell them about the symptoms of hypoglycemia  Ask them to give you a source of fast-acting carbohydrate if you cannot get it yourself  Ask them to give you a glucagon injection if you have signs of hypoglycemia and you become unconscious or have a seizure  Ask them to call the local emergency number (911 in the [address])   This is an emergency  Tell them never to try to make you swallow anything if you faint or have a seizure  · Wear medical alert jewelry  or carry a card that says you have diabetes  Ask where to get these items  Prevent hypoglycemia:   · Take diabetes medicine as directed  Take your medicine at the right time and in the right amount  Do not  double the amount of medicine you take unless instructed by your diabetes care team      · Eat regular meals and snacks  Talk to your dietitian or diabetes care team about a meal plan that is right for you  Do not skip meals  · Check your blood sugar level as directed  Ask your diabetes care team what your blood sugar levels should be before and after you eat  Ask when and how often to check your blood sugar level  You may need to check at least 3 times each day  Record your blood sugar level results and take the record with you when you see your care team  Changes may need to be made to your medicine, food, or exercise schedules using the record  · Check your blood sugar level before you exercise  Physical activity, such as exercise, can decrease your blood sugar level  If your blood sugar level is less than 100 mg/dL, have a carbohydrate snack  Examples are 4 to 6 crackers, ½ banana, 8 ounces (1 cup) of nonfat or 1% milk, or 4 ounces (½ cup) of juice  If you will be active for more than 1 hour, you may need to check your blood sugar level every 30 minutes  Your diabetes care team may also recommend that you check your blood sugar level after your activity      · Know the risks if you choose to drink alcohol  Alcohol can cause your blood sugar levels to be low if you use insulin  Alcohol can cause high blood sugar levels and weight gain if you drink too much  Women 21 years or older and men 72 years or older should limit alcohol to 1 drink a day  Men aged 24 to 59 years should limit alcohol to 2 drinks a day  A drink of alcohol is 12 ounces of beer, 5 ounces of wine, or 1½ ounces of liquor  Follow up with your diabetes care team or specialist as directed: You may need your insulin or diabetes medicine changed if you continue to have hypoglycemia episodes  Write down your questions so you remember to ask them during your visits  © Copyright [employer] 2021 Information is for End User's use only and may not be sold, redistributed or otherwise used for commercial purposes  All illustrations and images included in CareNotes® are the copyrighted property of [employer] Inc  or Cristiane Whitehead   The above information is an  only  It is not intended as medical advice for individual conditions or treatments  Talk to your doctor, nurse or pharmacist before following any medical regimen to see if it is safe and effective for you

## 2022-01-14 NOTE — ASSESSMENT & PLAN NOTE
Lab Results   Component Value Date    HGBA1C 11 6 (A) 01/13/2022   Diabetes is poorly controlled-patient counseled complications uncontrolled diabetes including retinopathy, nephropathy neuropathy  He is not taking his medications as directed, has not seen the nutritionist and has not been checking his blood sugars as directed  He was counseled on the importance of taking medications as directed  Now advised to take Lantus 30 units at bedtime  NovoLog 8 units before meals  Discussed starting GLP 1 agonist with the patient  Side effects  discussed  He is reluctant now and wants think about it    Is interested in getting a personal continuous glucose monitor

## 2022-01-20 ENCOUNTER — RA CDI HCC (OUTPATIENT)
Dept: OTHER | Facility: HOSPITAL | Age: 77
End: 2022-01-20

## 2022-01-20 NOTE — PROGRESS NOTES
[facility]  coding opportunities       Chart reviewed, no opportunity found: CHART REVIEWED, NO OPPORTUNITY FOUND                        Patients insurance company: [employer] only)

## 2022-01-24 DIAGNOSIS — I10 ESSENTIAL HYPERTENSION: ICD-10-CM

## 2022-01-24 DIAGNOSIS — E11.9 TYPE 2 DIABETES MELLITUS WITHOUT COMPLICATION, WITHOUT LONG-TERM CURRENT USE OF INSULIN (HCC): ICD-10-CM

## 2022-01-24 RX ORDER — LISINOPRIL 5 MG/1
5 TABLET ORAL DAILY
Qty: 90 TABLET | Refills: 0 | Status: SHIPPED | OUTPATIENT
Start: 2022-01-24 | End: 2022-06-09

## 2022-01-24 RX ORDER — SIMVASTATIN 20 MG
20 TABLET ORAL
Qty: 90 TABLET | Refills: 0 | Status: SHIPPED | OUTPATIENT
Start: 2022-01-24 | End: 2022-06-09

## 2022-01-26 ENCOUNTER — PATIENT OUTREACH (OUTPATIENT)
Dept: FAMILY MEDICINE CLINIC | Facility: CLINIC | Age: 77
End: 2022-01-26

## 2022-01-26 NOTE — PROGRESS NOTES
Spoke with Tressa Montano he did not come to his PCP appointment because he did not change his insurance card to reflect Dr Naeem Cui as PCP  We discussed his endocrinology appointment but he states he does not remember seeing the doctor  Reviewed he is to take his lantus and novolog insulin  He says he is  Not sure why he cannot take his daily oral medications  I asked him what would help him take them daily and he is not sure  He did buy oatmeal and shredded wheat  Tressa Montano said he just woke up so I will call back another day

## 2022-02-02 ENCOUNTER — RA CDI HCC (OUTPATIENT)
Dept: OTHER | Facility: HOSPITAL | Age: 77
End: 2022-02-02

## 2022-02-02 NOTE — PROGRESS NOTES
[facility]  coding opportunities       Chart reviewed, no opportunity found: CHART REVIEWED, NO OPPORTUNITY FOUND                        Patients insurance company: [employer] only)

## 2022-02-10 ENCOUNTER — PATIENT OUTREACH (OUTPATIENT)
Dept: FAMILY MEDICINE CLINIC | Facility: CLINIC | Age: 77
End: 2022-02-10

## 2022-02-10 NOTE — PROGRESS NOTES
Informed John about pharmacy pill packing and blood pressure cuff  He asked about medication for depression I said he needs to make an appointment to talk about that  He feel down and blue not wanting to hurt himself  He said he still needs to change his insurance to Dr Lev Britton  Strongly encouraged him to do so and then make the appointment  He did take a blood sugar three days ago it was fasting 241 was his reading  I will check back in 2 weeks

## 2022-02-10 NOTE — PROGRESS NOTES
Spoke with pharmacy they will talk with patient about pill packing because they have different options  Pharmacist said send script for blood pressure cuff and he will run it thru insurance and see if it is covered

## 2022-02-10 NOTE — PROGRESS NOTES
Spoke with Mimi Juan he was drinking coffee and taking his oral medications  Still not taking them every day but a few times a week  He said PCP office needed to call pharmacy for pill packing I will do so  He asked for blood pressure cuff I will check if pharmacy carries those

## 2022-02-11 ENCOUNTER — PATIENT OUTREACH (OUTPATIENT)
Dept: FAMILY MEDICINE CLINIC | Facility: CLINIC | Age: 77
End: 2022-02-11

## 2022-02-11 DIAGNOSIS — E11.65 TYPE 2 DIABETES MELLITUS WITH HYPERGLYCEMIA, WITHOUT LONG-TERM CURRENT USE OF INSULIN (HCC): ICD-10-CM

## 2022-02-11 NOTE — PROGRESS NOTES
Three way call with patient and [employer] insurance purpose to change provider on his insurance card  He will receive his new card in 5-7 days  Reminded Lola Gross he can go to pharmacy to get the blood pressure cuff  morphine SR (MS CONTIN) 30 MG Tab CR 12 hr tablet Take 1 tablet by mouth 2 times daily.     Patient stated that he needs a refill on this medication.

## 2022-02-12 RX ORDER — INSULIN ASPART 100 [IU]/ML
INJECTION, SOLUTION INTRAVENOUS; SUBCUTANEOUS
Qty: 15 ML | Refills: 0 | Status: SHIPPED | OUTPATIENT
Start: 2022-02-12 | End: 2022-06-09

## 2022-02-12 RX ORDER — INSULIN GLARGINE 100 [IU]/ML
30 INJECTION, SOLUTION SUBCUTANEOUS DAILY
Qty: 15 ML | Refills: 0 | Status: SHIPPED | OUTPATIENT
Start: 2022-02-12 | End: 2022-05-10

## 2022-02-23 ENCOUNTER — PATIENT OUTREACH (OUTPATIENT)
Dept: FAMILY MEDICINE CLINIC | Facility: CLINIC | Age: 77
End: 2022-02-23

## 2022-02-23 NOTE — PROGRESS NOTES
Spoke with Vladimir Ge he thinks he received new insurance card he thinks he received the card  Did not get blood pressure cuff yet the pharmacy needed to order one he will check with them today  Encouraged him to call PCP office to make appointment when he finds his card

## 2022-03-02 ENCOUNTER — PATIENT OUTREACH (OUTPATIENT)
Dept: FAMILY MEDICINE CLINIC | Facility: CLINIC | Age: 77
End: 2022-03-02

## 2022-03-02 NOTE — PROGRESS NOTES
Spoke briefly with Alison Romano he is on the road driving   He did find his new insurance card I will have office reach out to make appointment with Dr Dina Cloud,

## 2022-03-11 ENCOUNTER — OFFICE VISIT (OUTPATIENT)
Dept: FAMILY MEDICINE CLINIC | Facility: CLINIC | Age: 77
End: 2022-03-11
Payer: COMMERCIAL

## 2022-03-11 VITALS
TEMPERATURE: 97.6 F | HEIGHT: 62 IN | SYSTOLIC BLOOD PRESSURE: 152 MMHG | HEART RATE: 56 BPM | WEIGHT: 173 LBS | DIASTOLIC BLOOD PRESSURE: 98 MMHG | BODY MASS INDEX: 31.83 KG/M2 | RESPIRATION RATE: 20 BRPM | OXYGEN SATURATION: 100 %

## 2022-03-11 DIAGNOSIS — Z79.4 TYPE 2 DIABETES MELLITUS WITH DIABETIC POLYNEUROPATHY, WITH LONG-TERM CURRENT USE OF INSULIN (HCC): Primary | ICD-10-CM

## 2022-03-11 DIAGNOSIS — I10 ESSENTIAL HYPERTENSION: ICD-10-CM

## 2022-03-11 DIAGNOSIS — E78.5 DYSLIPIDEMIA: ICD-10-CM

## 2022-03-11 DIAGNOSIS — Z23 ENCOUNTER FOR IMMUNIZATION: ICD-10-CM

## 2022-03-11 DIAGNOSIS — E11.42 TYPE 2 DIABETES MELLITUS WITH DIABETIC POLYNEUROPATHY, WITH LONG-TERM CURRENT USE OF INSULIN (HCC): Primary | ICD-10-CM

## 2022-03-11 DIAGNOSIS — I73.9 PAD (PERIPHERAL ARTERY DISEASE) (HCC): ICD-10-CM

## 2022-03-11 DIAGNOSIS — Z00.00 MEDICARE ANNUAL WELLNESS VISIT, SUBSEQUENT: ICD-10-CM

## 2022-03-11 DIAGNOSIS — R20.9 BILATERAL COLD FEET: ICD-10-CM

## 2022-03-11 PROCEDURE — 3080F DIAST BP >= 90 MM HG: CPT | Performed by: FAMILY MEDICINE

## 2022-03-11 PROCEDURE — 1125F AMNT PAIN NOTED PAIN PRSNT: CPT | Performed by: FAMILY MEDICINE

## 2022-03-11 PROCEDURE — 1160F RVW MEDS BY RX/DR IN RCRD: CPT | Performed by: FAMILY MEDICINE

## 2022-03-11 PROCEDURE — G0009 ADMIN PNEUMOCOCCAL VACCINE: HCPCS

## 2022-03-11 PROCEDURE — 1170F FXNL STATUS ASSESSED: CPT | Performed by: FAMILY MEDICINE

## 2022-03-11 PROCEDURE — 3077F SYST BP >= 140 MM HG: CPT | Performed by: FAMILY MEDICINE

## 2022-03-11 PROCEDURE — 90670 PCV13 VACCINE IM: CPT

## 2022-03-11 PROCEDURE — 1036F TOBACCO NON-USER: CPT | Performed by: FAMILY MEDICINE

## 2022-03-11 PROCEDURE — G0439 PPPS, SUBSEQ VISIT: HCPCS | Performed by: FAMILY MEDICINE

## 2022-03-11 PROCEDURE — 3725F SCREEN DEPRESSION PERFORMED: CPT | Performed by: FAMILY MEDICINE

## 2022-03-11 PROCEDURE — 1101F PT FALLS ASSESS-DOCD LE1/YR: CPT | Performed by: FAMILY MEDICINE

## 2022-03-11 PROCEDURE — 3288F FALL RISK ASSESSMENT DOCD: CPT | Performed by: FAMILY MEDICINE

## 2022-03-11 PROCEDURE — 4040F PNEUMOC VAC/ADMIN/RCVD: CPT | Performed by: FAMILY MEDICINE

## 2022-03-11 PROCEDURE — 99214 OFFICE O/P EST MOD 30 MIN: CPT | Performed by: FAMILY MEDICINE

## 2022-03-11 RX ORDER — LATANOPROST 50 UG/ML
SOLUTION/ DROPS OPHTHALMIC
COMMUNITY
Start: 2022-01-31

## 2022-03-11 RX ORDER — BIMATOPROST 0.01 %
DROPS OPHTHALMIC (EYE)
COMMUNITY
Start: 2022-01-29

## 2022-03-11 RX ORDER — TIMOLOL MALEATE 5 MG/ML
SOLUTION/ DROPS OPHTHALMIC
COMMUNITY
Start: 2022-01-31

## 2022-03-11 NOTE — PATIENT INSTRUCTIONS
10% - bad control"> 10% - bad control,Hemoglobin A1c (HbA1c) greater than 10% indicating poor diabetic control,Haemoglobin A1c greater than 10% indicating poor diabetic control">   Diabetes Mellitus Type 2 in Adults, Ambulatory Care   GENERAL INFORMATION:   Diabetes mellitus type 2  is a disease that affects how your body uses glucose (sugar)  Insulin helps move sugar out of the blood so it can be used for energy  Normally, when the blood sugar level increases, the pancreas makes more insulin  Type 2 diabetes develops because either the body cannot make enough insulin, or it cannot use the insulin correctly  After many years, your pancreas may stop making insulin  Common symptoms include the following:   · More hunger or thirst than usual     · Frequent urination     · Weight loss without trying     · Blurred vision  Seek immediate care for the following symptoms:   · Severe abdominal pain, or pain that spreads to your back  You may also be vomiting  · Trouble staying awake or focusing    · Shaking or sweating    · Blurred or double vision    · Breath has a fruity, sweet smell    · Breathing is deep and labored, or rapid and shallow    · Heartbeat is fast and weak  Treatment for diabetes mellitus type 2  includes keeping your blood sugar at a normal level  You must eat the right foods, and exercise regularly  You may also need medicine if you cannot control your blood sugar level with nutrition and exercise  Manage diabetes mellitus type 2:   · Check your blood sugar level  You will be taught how to check a small drop of blood in a glucose monitor  Ask your healthcare provider when and how often to check during the day  Ask your healthcare provider what your blood sugar levels should be when you check them  · Keep track of carbohydrates (sugar and starchy foods)  Your blood sugar level can get too high if you eat too many carbohydrates   Your dietitian will help you plan meals and snacks that have the right amount of carbohydrates  · Eat low-fat foods  Some examples are skinless chicken and low-fat milk  · Eat less sodium (salt)  Some examples of high-sodium foods to limit are soy sauce, potato chips, and soup  Do not add salt to food you cook  Limit your use of table salt  · Eat high-fiber foods  Foods that are a good source of fiber include vegetables, whole grain bread, and beans  · Limit alcohol  Alcohol affects your blood sugar level and can make it harder to manage your diabetes  Women should limit alcohol to 1 drink a day  Men should limit alcohol to 2 drinks a day  A drink of alcohol is 12 ounces of beer, 5 ounces of wine, or 1½ ounces of liquor  · Get regular exercise  Exercise can help keep your blood sugar level steady, decrease your risk of heart disease, and help you lose weight  Exercise for at least 30 minutes, 5 days a week  Include muscle strengthening activities 2 days each week  Work with your healthcare provider to create an exercise plan  · Check your feet each day  for injuries or open sores  Ask your healthcare provider for activities you can do if you have an open sore  · Quit smoking  If you smoke, it is never too late to quit  Smoking can worsen the problems that may occur with diabetes  Ask your healthcare provider for information about how to stop smoking if you are having trouble quitting  · Ask about your weight:  Ask healthcare providers if you need to lose weight, and how much to lose  Ask them to help you with a weight loss program  Even a 10 to 15 pound weight loss can help you manage your blood sugar level  · Carry medical alert identification  Wear medical alert jewelry or carry a card that says you have diabetes  Ask your healthcare provider where to get these items  · Ask about vaccines  Diabetes puts you at risk of serious illness if you get the flu, pneumonia, or hepatitis   Ask your healthcare provider if you should get a flu, pneumonia, or hepatitis B vaccine, and when to get the vaccine  Follow up with your healthcare provider as directed:  Write down your questions so you remember to ask them during your visits  CARE AGREEMENT:   You have the right to help plan your care  Learn about your health condition and how it may be treated  Discuss treatment options with your caregivers to decide what care you want to receive  You always have the right to refuse treatment  The above information is an  only  It is not intended as medical advice for individual conditions or treatments  Talk to your doctor, nurse or pharmacist before following any medical regimen to see if it is safe and effective for you  © 2014 [address] is for End User's use only and may not be sold, redistributed or otherwise used for commercial purposes  All illustrations and images included in CareNotes® are the copyrighted property of [employer]  or [employer]  Foot Care for People with Diabetes   AMBULATORY CARE:   What you need to know about foot care:   · Foot care helps protect your feet and prevent foot ulcers or sores  Long-term high blood sugar levels can damage the blood vessels and nerves in your legs and feet  This damage makes it hard to feel pressure, pain, temperature, and touch  You may not be able to feel a cut or sore, or shoes that are too tight  Foot care is needed to prevent serious problems, such as an infection or amputation  · Diabetes may cause your toes to become crooked or curved under  These changes may affect the way you walk and can lead to increased pressure on your foot  The pressure can decrease blood flow to your feet  Lack of blood flow increases your risk for a foot ulcer  Do not ignore small problems, such as dry skin or small wounds  These can become life-threatening over time without proper care      Call your care team provider if:   · Your feet become numb, weak, or hard to move  · You have pus draining from a sore on your foot  · You have a wound on your foot that gets bigger, deeper, or does not heal      · You see blisters, cuts, scratches, calluses, or sores on your foot  · You have a fever, and your feet become red, warm, and swollen  · Your toenails become thick, curled, or yellow  · You find it hard to check your feet because your vision is poor  · You have questions or concerns about your condition or care  How to care for your feet:   · Check your feet each day  Look at your whole foot, including the bottom, and between and under your toes  Check for wounds, corns, and calluses  Use a mirror to see the bottom of your feet  The skin on your feet may be shiny, tight, or darker than normal  Your feet may also be cold and pale  Feel your feet by running your hands along the tops, bottoms, sides, and between your toes  Redness, swelling, and warmth are signs of blood flow problems that can lead to a foot ulcer  Do not try to remove corns or calluses yourself  · Wash your feet each day with soap and warm water  Do not use hot water, because this can injure your foot  Dry your feet gently with a towel after you wash them  Dry between and under your toes  · Apply lotion or a moisturizer on your dry feet  Ask your care team provider what lotions are best to use  Do not put lotion or moisturizer between your toes  Moisture between your toes could lead to skin breakdown  · Cut your toenails correctly  File or cut your toenails straight across  Use a soft brush to clean around your toenails  If your toenails are very thick, you may need to have a care team provider or specialist cut them  · Protect your feet  Do not walk barefoot or wear your shoes without socks  Check your shoes for rocks or other objects that can hurt your feet  Wear cotton socks to help keep your feet dry   Wear socks without toe seams, or wear them with the seams inside out  Change your socks each day  Do not wear socks that are dirty or damp  · Wear shoes that fit well  Wear shoes that do not rub against any area of your feet  Your shoes should be ½ to ¾ inch (1 to 2 centimeters) longer than your feet  Your shoes should also have extra space around the widest part of your feet  Walking or athletic shoes with laces or straps that adjust are best  Ask your care team provider for help to choose shoes that fit you best  Ask him or her if you need to wear an insert, orthotic, or bandage on your feet  · Go to your follow-up visits  Your care team provider will do a foot exam at least once a year  You may need a foot exam more often if you have nerve damage, foot deformities, or ulcers  He will check for nerve damage and how well you can feel your feet  He will check your shoes to see if they fit well  · Do not smoke  Smoking can damage your blood vessels and put you at increased risk for foot ulcers  Ask your care team provider for information if you currently smoke and need help to quit  E-cigarettes or smokeless tobacco still contain nicotine  Talk to your care team provider before you use these products  Follow up with your diabetes care team provider or foot specialist as directed: You will need to have your feet checked at least once a year  You may need a foot exam more often if you have nerve damage, foot deformities, or ulcers  Write down your questions so you remember to ask them during your visits  © Copyright [employer] 2022 Information is for End User's use only and may not be sold, redistributed or otherwise used for commercial purposes  All illustrations and images included in CareNotes® are the copyrighted property of [employer]  or Cristiane Whitehead   The above information is an  only  It is not intended as medical advice for individual conditions or treatments   Talk to your doctor, nurse or pharmacist before following any medical regimen to see if it is safe and effective for you

## 2022-03-11 NOTE — PROGRESS NOTES
Assessment/Plan:    Type 2 diabetes mellitus with hyperglycemia, with long-term current use of insulin ([facility])    Lab Results   Component Value Date    HGBA1C 11 6 (A) 01/13/2022     Uncontrolled  Advised pt to follow low carb diet and get insulin as instructed  [facility]  Essential hypertension  Uncontrolled  DASH diet  Advised pt to take lisinopril 5mg QD as instructed  Dyslipidemia  Low fat diet  Advised pt to take simvastatin 20mg qhs as instructed  Refused flu shot  Got Covid19 shots and booster  Give PCV13 today  RTO in 6 months  Diagnoses and all orders for this visit:    Type 2 diabetes mellitus with diabetic polyneuropathy, with long-term current use of insulin ([facility])  -     VAS lower limb arterial duplex, complete bilateral; Future    Bilateral cold feet  -     VAS lower limb arterial duplex, complete bilateral; Future    PAD (peripheral artery disease) ([facility])  -     VAS lower limb arterial duplex, complete bilateral; Future    Essential hypertension    Dyslipidemia    Medicare annual wellness visit, subsequent    Encounter for immunization  -     PNEUMOCOCCAL CONJUGATE VACCINE 13-VALENT GREATER THAN 6 MONTHS    Other orders  -     Lumigan 0 01 % ophthalmic drops  -     latanoprost (XALATAN) 0 005 % ophthalmic solution  -     timolol (TIMOPTIC) 0 5 % ophthalmic solution          Subjective:      Patient ID: Adama Taylor is a 68 y o  male  HPI    Pt is here by himself  DM---1/2022 hgA1C 11 6 uncontrolled  [facility]  He uses lantus 30u qhs  He suppose to use novolog 8u with meals  He missed it a lot  He suppose to use metformin 500mg bid  He missed it a lot  Sometimes constipated  Denies hypoglycemia episode  Neuropathy in feet  Always feels cold feet  Saw ophthalmology 9/2021  No diabetic change  No podiatrist       HTN---He supposed to be on lisinopril 5mg Qd   He only take it when "I think of it " Denies headache or chest pain      Hyperlipidemia----He takes simvastin 20mg when he think of it       Vit D deficiency---He suppose to take vit D but he did not  Glaucoma---[facility]mology       Quit smoking 30 years ago  Smoked 1ppd for 20 years  No alcohol        Lives with wife  Does all ADL's  Still drive  Denies recent falls  Denies depression           The following portions of the patient's history were reviewed and updated as appropriate: allergies, current medications, past family history, past medical history, past social history, past surgical history and problem list     Review of Systems   Constitutional: Negative for appetite change, chills and fever  HENT: Negative for congestion, ear pain, sinus pain and sore throat  Eyes: Negative for discharge and itching  Respiratory: Negative for apnea, cough, chest tightness, shortness of breath and wheezing  Cardiovascular: Negative for chest pain, palpitations and leg swelling  Gastrointestinal: Negative for abdominal pain, anal bleeding, constipation, diarrhea, nausea and vomiting  Endocrine: Negative for cold intolerance, heat intolerance and polyuria  Genitourinary: Negative for difficulty urinating and dysuria  Musculoskeletal: Negative for arthralgias, back pain and myalgias  Skin: Negative for rash  Neurological: Negative for dizziness and headaches  Psychiatric/Behavioral: Negative for agitation  Objective:      /98 (BP Location: Left arm, Patient Position: Sitting, Cuff Size: Adult)   Pulse 56   Temp 97 6 °F (36 4 °C) (Tympanic)   Resp 20   Ht 5' 1 5" (1 562 m)   Wt 78 5 kg (173 lb)   SpO2 100%   BMI 32 16 kg/m²          Physical Exam  Constitutional:       Appearance: He is well-developed  HENT:      Head: Normocephalic and atraumatic  Eyes:      General:         Right eye: No discharge  Left eye: No discharge        Conjunctiva/sclera: Conjunctivae normal    Cardiovascular:      Rate and Rhythm: Normal rate and regular rhythm  Pulses: no weak pulses          Dorsalis pedis pulses are 0 on the right side and 0 on the left side  Heart sounds: Normal heart sounds  No murmur heard  No friction rub  No gallop  Pulmonary:      Effort: Pulmonary effort is normal  No respiratory distress  Breath sounds: Normal breath sounds  No wheezing or rales  Abdominal:      General: Bowel sounds are normal  There is no distension  Palpations: Abdomen is soft  Tenderness: There is no abdominal tenderness  There is no guarding  Musculoskeletal:         General: Normal range of motion  Cervical back: Normal range of motion and neck supple  No tenderness  Right lower leg: No edema  Left lower leg: No edema  Feet:    Feet:      Right foot:      Skin integrity: Dry skin present  No ulcer, skin breakdown, erythema, warmth or callus  Left foot:      Skin integrity: Dry skin present  No ulcer, skin breakdown, erythema, warmth or callus  Lymphadenopathy:      Cervical: No cervical adenopathy  Neurological:      Mental Status: He is alert  Patient's shoes and socks removed  Right Foot/Ankle   Right Foot Inspection  Skin Exam: skin normal, skin intact and dry skin  No warmth, no callus, no erythema, no maceration, no abnormal color, no pre-ulcer, no ulcer and no callus  Sensory   Monofilament testing: absent    Vascular  The right DP pulse is 0  Left Foot/Ankle  Left Foot Inspection  Skin Exam: skin normal, skin intact and dry skin  No warmth, no erythema, no maceration, normal color, no pre-ulcer, no ulcer and no callus  Sensory   Monofilament testing: absent    Vascular  The left DP pulse is 0       Assign Risk Category  No deformity present  Loss of protective sensation  No weak pulses  Risk: 2

## 2022-03-11 NOTE — ASSESSMENT & PLAN NOTE
Lab Results   Component Value Date    HGBA1C 11 6 (A) 01/13/2022     Uncontrolled  Advised pt to follow low carb diet and get insulin as instructed  [facility]

## 2022-03-11 NOTE — PROGRESS NOTES
Chief Complaint   Patient presents with   [facility]     Subsequent   Follow-up     Routine overdue  Health Maintenance   Topic Date Due    Pneumococcal Vaccine: 65+ Years (1 of 2 - PPSV23) Never done    DTaP,Tdap,and Td Vaccines (1 - Tdap) Never done   [facility]ill [facility] Medicare Annual Wellness Visit (AWV)  05/20/2020    Influenza Vaccine (1) 09/01/2021    COVID-19 Vaccine (3 - Booster for Pfizer series) 10/17/2021    HEMOGLOBIN A1C  04/13/2022    BMI: Followup Plan  06/18/2022    Colorectal Cancer Screening  06/19/2022    Fall Risk  03/11/2023    Depression Screening  03/11/2023    BMI: Adult  03/11/2023    Diabetic Foot Exam  03/11/2023    DM Eye Exam  09/22/2023    Hepatitis C Screening  Completed    HIB Vaccine  Aged Out    Hepatitis B Vaccine  Aged Out    IPV Vaccine  Aged Out    Hepatitis A Vaccine  Aged Out    Meningococcal ACWY Vaccine  Aged Out    HPV Vaccine  Aged Out      Assessment and Plan:     Problem List Items Addressed This Visit        Endocrine    Type 2 diabetes mellitus with hyperglycemia, with long-term current use of insulin ([facility] [address] ) - Primary       Lab Results   Component Value Date    HGBA1C 11 6 (A) 01/13/2022     Uncontrolled  Advised pt to follow low carb diet and get insulin as instructed  FU endocrinology  Cardiovascular and Mediastinum    Essential hypertension     Uncontrolled  DASH diet  Advised pt to take lisinopril 5mg QD as instructed  Other    Dyslipidemia     Low fat diet  Advised pt to take simvastatin 20mg qhs as instructed              Other Visit Diagnoses     Bilateral cold feet        Relevant Orders    VAS lower limb arterial duplex, complete bilateral    PAD (peripheral artery disease) (HCC)        Relevant Orders    VAS lower limb arterial duplex, complete bilateral    Medicare annual wellness visit, subsequent        Encounter for immunization        Relevant Orders    PNEUMOCOCCAL CONJUGATE VACCINE 13-VALENT GREATER THAN 6 MONTHS        BMI Counseling: Body mass index is 32 16 kg/m²  Follow-up plan was not completed due to elderly patient (72 years old) where weight reduction/weight gain would complicate underlying health condition such as: illness or physical disability  Depression Screening and Follow-up Plan: Patient was screened for depression during today's encounter  They screened negative with a PHQ-2 score of 0  Preventive health issues were discussed with patient, and age appropriate screening tests were ordered as noted in patient's After Visit Summary  Personalized health advice and appropriate referrals for health education or preventive services given if needed, as noted in patient's After Visit Summary  History of Present Illness:     Patient presents for Medicare Annual Wellness visit    Patient Care Team:  Eugene Delatorre MD as PCP - General (Family Medicine)  Chau Harrington MD as PCP - Endocrinology (Endocrinology)  MD Ruba Kay RN as RN Care Manager     Problem List:     Patient Active Problem List   Diagnosis    DMII (diabetes mellitus, type 2) ([facility] [address] )    BPH (benign prostatic hyperplasia)    Dyslipidemia    Essential hypertension    Psoriasis    Obesity (BMI 30-39  9)    Type 2 diabetes mellitus with hyperglycemia, with long-term current use of insulin ([facility])    Vitamin D deficiency    Hypercalcemia    Cough    Tinnitus of both ears    Hyperparathyroidism ([address] )      Past Medical and Surgical History:     Past Medical History:   Diagnosis Date    Diabetes mellitus ([facility] [address] )      Past Surgical History:   Procedure Laterality Date    COLONOSCOPY W/ POLYPECTOMY      LEG SURGERY Right     TONSILLECTOMY        Family History:     Family History   Problem Relation Age of Onset    Heart disease Mother     Cancer Sister         bladder     Diabetes Brother     Leukemia Brother       Social History:     Social History     Socioeconomic History    Marital status: /Civil Union     Spouse name: None    Number of children: None    Years of education: None    Highest education level: None   Occupational History    None   Tobacco Use    Smoking status: Former Smoker     Types: Cigarettes     Quit date:      Years since quittin 2    Smokeless tobacco: Never Used   Vaping Use    Vaping Use: Never used   Substance and Sexual Activity    Alcohol use: No     Comment: very little    Drug use: No    Sexual activity: None   Other Topics Concern    None   Social History Narrative    Caffeine-2 cups per day     Social Determinants of Health     Financial Resource Strain: Low Risk     Difficulty of Paying Living Expenses: Not hard at all   Food Insecurity: No Food Insecurity    Worried About Running Out of Food in the Last Year: Never true    Amaris of Food in the Last Year: Never true   Transportation Needs: No Transportation Needs    Lack of Transportation (Medical): No    Lack of Transportation (Non-Medical): No   Physical Activity: Inactive    Days of Exercise per Week: 0 days    Minutes of Exercise per Session: 0 min   Stress: Stress Concern Present    Feeling of Stress : Rather much   Social Connections:  Moderately Integrated    Frequency of Communication with Friends and Family: Never    Frequency of Social Gatherings with Friends and Family: More than three times a week    Attends Jain Services: More than 4 times per year    Active Member of [employer] or Organizations: No    Attends Club or Organization Meetings: Never    Marital Status:    Intimate Partner Violence: Not At Risk    Fear of Current or Ex-Partner: No    Emotionally Abused: No    Physically Abused: No    Sexually Abused: No   Housing Stability: Unknown    Unable to Pay for Housing in the Last Year: No    Number of [place] in the Last Year: Not on file    Unstable Housing in the Last Year: No      Medications and Allergies:     Current Outpatient Medications   Medication Sig Dispense Refill    Accu-Chek Softclix Lancets lancets CHECK 3 TIMES A  each 3    BD Pen Needle Apple U/F 32G X 4 MM MISC USE FOUR TIMES A  each 3    Blood Glucose Monitoring Suppl (Accu-Chek Noemy Plus) w/Device KIT Use 3 (three) times a day 1 kit 0    Blood Pressure Monitoring (Blood Pressure Kit) SHEEBA Check BP at home twice per day  1 each 0    Cholecalciferol (Vitamin D3) 50 MCG (2000 UT) capsule Take 1 capsule (2,000 Units total) by mouth daily (Patient not taking: Reported on 1/13/2022 ) 30 capsule 5    glucose blood (Accu-Chek Noemy Plus) test strip Use 1 each 3 (three) times a day 250 each 3    insulin aspart (NovoLOG FlexPen) 100 UNIT/ML injection pen INJECT 8 UNITS BEFORE MEALS 15 mL 0    Lantus SoloStar 100 units/mL injection pen INJECT 30 UNITS UNDER THE SKIN DAILY 15 mL 0    latanoprost (XALATAN) 0 005 % ophthalmic solution       lisinopril (ZESTRIL) 5 mg tablet TAKE 1 TABLET (5 MG TOTAL) BY MOUTH DAILY 90 tablet 0    Lumigan 0 01 % ophthalmic drops       metFORMIN (GLUCOPHAGE) 500 mg tablet Take 1 tablet (500 mg total) by mouth 2 (two) times a day with meals 60 tablet 1    simvastatin (ZOCOR) 20 mg tablet TAKE 1 TABLET (20 MG TOTAL) BY MOUTH DAILY AT BEDTIME 90 tablet 0    timolol (TIMOPTIC) 0 5 % ophthalmic solution        No current facility-administered medications for this visit       No Known Allergies   Immunizations:     Immunization History   Administered Date(s) Administered    COVID-19 PFIZER VACCINE 0 3 ML IM 04/26/2021, 05/17/2021    INFLUENZA 10/13/2017    Influenza Split High Dose Preservative Free IM 11/12/2013      Health Maintenance:         Topic Date Due    Colorectal Cancer Screening  06/19/2022    Hepatitis C Screening  Completed         Topic Date Due    Pneumococcal Vaccine: 65+ Years (1 of 2 - PPSV23) Never done    DTaP,Tdap,and Td Vaccines (1 - Tdap) Never done    Influenza Vaccine (1) 09/01/2021    COVID-19 Vaccine (3 - Booster for SpinVox series) 10/17/2021      Medicare Health Risk Assessment:     /98 (BP Location: Left arm, Patient Position: Sitting, Cuff Size: Adult)   Pulse 56   Temp 97 6 °F (36 4 °C) (Tympanic)   Resp 20   Ht 5' 1 5" (1 562 m)   Wt 78 5 kg (173 lb)   SpO2 100%   BMI 32 16 kg/m²      Lola Gross is here for his Subsequent Wellness visit  Health Risk Assessment:   Patient rates overall health as excellent  Patient feels that their physical health rating is same  Patient is satisfied with their life  Eyesight was rated as same  Hearing was rated as same  Patient feels that their emotional and mental health rating is same  Patients states they are never, rarely angry  Patient states they are never, rarely unusually tired/fatigued  Pain experienced in the last 7 days has been none  Patient states that he has experienced no weight loss or gain in last 6 months  Depression Screening:   PHQ-2 Score: 0      Fall Risk Screening: In the past year, patient has experienced: no history of falling in past year      Home Safety:  Patient does not have trouble with stairs inside or outside of their home  Patient has working smoke alarms and has working carbon monoxide detector  Home safety hazards include: none  Nutrition:   Seldomly eats because he doesn't have food  Medications:   Patient is currently taking over-the-counter supplements  OTC medications include: see medication list  Patient is able to manage medications  Activities of Daily Living (ADLs)/Instrumental Activities of Daily Living (IADLs):   Walk and transfer into and out of bed and chair?: Yes  Dress and groom yourself?: Yes    Bathe or shower yourself?: Yes    Feed yourself?  Yes  Do your laundry/housekeeping?: Yes  Manage your money, pay your bills and track your expenses?: Yes  Make your own meals?: Yes    Do your own shopping?: Yes    Previous Hospitalizations:   Any hospitalizations or ED visits within the last 12 months?: No      Advance Care Planning:   Living will: No    Durable POA for healthcare: No    Advanced directive: No    Advanced directive counseling given: Yes    Five wishes given: Yes      Cognitive Screening:   Provider or family/friend/caregiver concerned regarding cognition?: No    PREVENTIVE SCREENINGS      Cardiovascular Screening:    General: Screening Current      Diabetes Screening:     General: History Diabetes and Screening Current      Colorectal Cancer Screening:     General: Screening Current      Prostate Cancer Screening:    General: Screening Not Indicated      Abdominal Aortic Aneurysm (AAA) Screening:    Risk factors include: tobacco use        Lung Cancer Screening:     General: Screening Not Indicated      Hepatitis C Screening:    General: Screening Current    Screening, Brief Intervention, and Referral to Treatment (SBIRT)    Screening  Typical number of drinks in a day: 0  Typical number of drinks in a week: 0  Interpretation: Low risk drinking behavior      Single Item Drug Screening:  How often have you used an illegal drug (including marijuana) or a prescription medication for non-medical reasons in the past year? never    Single Item Drug Screen Score: 0  Interpretation: Negative screen for possible drug use disorder      Alex Yap MD

## 2022-03-23 ENCOUNTER — PATIENT OUTREACH (OUTPATIENT)
Dept: FAMILY MEDICINE CLINIC | Facility: CLINIC | Age: 77
End: 2022-03-23

## 2022-03-30 ENCOUNTER — PATIENT OUTREACH (OUTPATIENT)
Dept: FAMILY MEDICINE CLINIC | Facility: CLINIC | Age: 77
End: 2022-03-30

## 2022-04-06 ENCOUNTER — PATIENT OUTREACH (OUTPATIENT)
Dept: FAMILY MEDICINE CLINIC | Facility: CLINIC | Age: 77
End: 2022-04-06

## 2022-04-06 NOTE — LETTER
Date: 04/06/22    Dear Luis Gonzales,   My name is [facility]; I am a registered nurse care manager working with [address]  [address]  I have not been able to reach you and would like to set a time that I can talk with you over the phone   My work is to help patients that have complex medical conditions get the care they need  This includes patients who may have been in the hospital or emergency room  Please call me with any questions you may have  I look forward to speaking with you    Sincerely,  [facility]  [phone]  Outpatient Care Manager

## 2022-04-07 DIAGNOSIS — E11.65 TYPE 2 DIABETES MELLITUS WITH HYPERGLYCEMIA, WITHOUT LONG-TERM CURRENT USE OF INSULIN (HCC): ICD-10-CM

## 2022-04-08 RX ORDER — PEN NEEDLE, DIABETIC 32GX 5/32"
NEEDLE, DISPOSABLE MISCELLANEOUS
Qty: 100 EACH | Refills: 3 | Status: SHIPPED | OUTPATIENT
Start: 2022-04-08

## 2022-04-15 ENCOUNTER — PATIENT OUTREACH (OUTPATIENT)
Dept: FAMILY MEDICINE CLINIC | Facility: CLINIC | Age: 77
End: 2022-04-15

## 2022-04-15 NOTE — PROGRESS NOTES
Chart review no response to call or unable to reach letter  Will close from care management at this time

## 2022-05-10 DIAGNOSIS — E11.65 TYPE 2 DIABETES MELLITUS WITH HYPERGLYCEMIA, WITHOUT LONG-TERM CURRENT USE OF INSULIN (HCC): ICD-10-CM

## 2022-05-10 RX ORDER — INSULIN GLARGINE 100 [IU]/ML
30 INJECTION, SOLUTION SUBCUTANEOUS DAILY
Qty: 15 ML | Refills: 0 | Status: SHIPPED | OUTPATIENT
Start: 2022-05-10 | End: 2022-06-28

## 2022-05-31 ENCOUNTER — PATIENT OUTREACH (OUTPATIENT)
Dept: FAMILY MEDICINE CLINIC | Facility: CLINIC | Age: 77
End: 2022-05-31

## 2022-05-31 NOTE — PROGRESS NOTES
Nathan Carter called stating " I dont know who I am suppose to call"  I check his chart he missed vascular study  He had central scheduling phone number instructed him to call them to reschedule  He said he would do so

## 2022-06-09 DIAGNOSIS — E11.9 TYPE 2 DIABETES MELLITUS WITHOUT COMPLICATION, WITHOUT LONG-TERM CURRENT USE OF INSULIN (HCC): ICD-10-CM

## 2022-06-09 DIAGNOSIS — I10 ESSENTIAL HYPERTENSION: ICD-10-CM

## 2022-06-09 DIAGNOSIS — E11.65 TYPE 2 DIABETES MELLITUS WITH HYPERGLYCEMIA, WITHOUT LONG-TERM CURRENT USE OF INSULIN (HCC): ICD-10-CM

## 2022-06-09 RX ORDER — LISINOPRIL 5 MG/1
5 TABLET ORAL DAILY
Qty: 90 TABLET | Refills: 0 | Status: SHIPPED | OUTPATIENT
Start: 2022-06-09

## 2022-06-09 RX ORDER — SIMVASTATIN 20 MG
20 TABLET ORAL
Qty: 90 TABLET | Refills: 0 | Status: SHIPPED | OUTPATIENT
Start: 2022-06-09

## 2022-06-09 RX ORDER — INSULIN ASPART 100 [IU]/ML
INJECTION, SOLUTION INTRAVENOUS; SUBCUTANEOUS
Qty: 15 ML | Refills: 0 | Status: SHIPPED | OUTPATIENT
Start: 2022-06-09

## 2022-06-28 DIAGNOSIS — E11.65 TYPE 2 DIABETES MELLITUS WITH HYPERGLYCEMIA, WITHOUT LONG-TERM CURRENT USE OF INSULIN (HCC): ICD-10-CM

## 2022-06-28 RX ORDER — INSULIN GLARGINE 100 [IU]/ML
30 INJECTION, SOLUTION SUBCUTANEOUS DAILY
Qty: 15 ML | Refills: 0 | Status: SHIPPED | OUTPATIENT
Start: 2022-06-28 | End: 2022-08-17

## 2022-07-28 DIAGNOSIS — E11.9 TYPE 2 DIABETES MELLITUS WITHOUT COMPLICATION, WITHOUT LONG-TERM CURRENT USE OF INSULIN (HCC): ICD-10-CM

## 2022-07-28 RX ORDER — BLOOD SUGAR DIAGNOSTIC
1 STRIP MISCELLANEOUS 3 TIMES DAILY
Qty: 250 EACH | Refills: 3 | Status: SHIPPED | OUTPATIENT
Start: 2022-07-28

## 2022-09-08 ENCOUNTER — RA CDI HCC (OUTPATIENT)
Dept: OTHER | Facility: HOSPITAL | Age: 77
End: 2022-09-08

## 2022-09-08 NOTE — PROGRESS NOTES
[facility] coding opportunities       Chart reviewed, no opportunity found:   Kathie Lima        Patients Insurance     Medicare [facility]: [facility]

## 2022-10-12 PROBLEM — R05.9 COUGH: Status: RESOLVED | Noted: 2021-06-18 | Resolved: 2022-10-12

## 2022-12-09 LAB
LEFT EYE DIABETIC RETINOPATHY: NORMAL
RIGHT EYE DIABETIC RETINOPATHY: NORMAL

## 2023-06-08 ENCOUNTER — VBI (OUTPATIENT)
Dept: ADMINISTRATIVE | Facility: OTHER | Age: 78
End: 2023-06-08

## 2024-02-02 ENCOUNTER — VBI (OUTPATIENT)
Dept: ADMINISTRATIVE | Facility: OTHER | Age: 79
End: 2024-02-02

## 2024-02-02 NOTE — TELEPHONE ENCOUNTER
02/02/24 1:16 PM     [employer] SmartForm used to document caregap status.    Alverto Pfeiffer MA